# Patient Record
Sex: FEMALE | Race: ASIAN | NOT HISPANIC OR LATINO | Employment: FULL TIME | URBAN - METROPOLITAN AREA
[De-identification: names, ages, dates, MRNs, and addresses within clinical notes are randomized per-mention and may not be internally consistent; named-entity substitution may affect disease eponyms.]

---

## 2022-11-22 ENCOUNTER — TELEPHONE (OUTPATIENT)
Dept: OBGYN | Facility: CLINIC | Age: 37
End: 2022-11-22

## 2022-11-22 NOTE — TELEPHONE ENCOUNTER
New pt to our clinic-TE under on call provider   LMP 10/5/22 5w 0d    Spotting for 5 days, pinkish brown-wearing a panty liner-bleeding is NOT heavy  Some mild cramping intermittently    Discussed spotting and cramping in early pregnancy    Precautions discussed that would warrant emergent evaluation in the ER  Call was completed with .    Transferred to scheduling to make an appt to establish care and viability US  Cari Pena RN on 11/22/2022 at 12:30 PM

## 2022-12-05 ENCOUNTER — OFFICE VISIT (OUTPATIENT)
Dept: OBGYN | Facility: CLINIC | Age: 37
End: 2022-12-05
Payer: COMMERCIAL

## 2022-12-05 ENCOUNTER — ANCILLARY PROCEDURE (OUTPATIENT)
Dept: ULTRASOUND IMAGING | Facility: CLINIC | Age: 37
End: 2022-12-05
Payer: COMMERCIAL

## 2022-12-05 VITALS
HEIGHT: 66 IN | DIASTOLIC BLOOD PRESSURE: 66 MMHG | SYSTOLIC BLOOD PRESSURE: 108 MMHG | BODY MASS INDEX: 21.08 KG/M2 | WEIGHT: 131.2 LBS

## 2022-12-05 DIAGNOSIS — O36.80X0 ENCOUNTER TO DETERMINE FETAL VIABILITY OF PREGNANCY, SINGLE OR UNSPECIFIED FETUS: ICD-10-CM

## 2022-12-05 DIAGNOSIS — O36.80X0 ENCOUNTER TO DETERMINE FETAL VIABILITY OF PREGNANCY, SINGLE OR UNSPECIFIED FETUS: Primary | ICD-10-CM

## 2022-12-05 DIAGNOSIS — O20.9 BLEEDING IN EARLY PREGNANCY: Primary | ICD-10-CM

## 2022-12-05 LAB
ABO/RH(D): NORMAL
SPECIMEN EXPIRATION DATE: NORMAL

## 2022-12-05 PROCEDURE — 99203 OFFICE O/P NEW LOW 30 MIN: CPT | Performed by: STUDENT IN AN ORGANIZED HEALTH CARE EDUCATION/TRAINING PROGRAM

## 2022-12-05 PROCEDURE — 86901 BLOOD TYPING SEROLOGIC RH(D): CPT | Performed by: STUDENT IN AN ORGANIZED HEALTH CARE EDUCATION/TRAINING PROGRAM

## 2022-12-05 PROCEDURE — 36415 COLL VENOUS BLD VENIPUNCTURE: CPT | Performed by: STUDENT IN AN ORGANIZED HEALTH CARE EDUCATION/TRAINING PROGRAM

## 2022-12-05 PROCEDURE — 86900 BLOOD TYPING SEROLOGIC ABO: CPT | Performed by: STUDENT IN AN ORGANIZED HEALTH CARE EDUCATION/TRAINING PROGRAM

## 2022-12-05 PROCEDURE — 76817 TRANSVAGINAL US OBSTETRIC: CPT | Performed by: STUDENT IN AN ORGANIZED HEALTH CARE EDUCATION/TRAINING PROGRAM

## 2022-12-05 RX ORDER — FOLIC ACID 1 MG/1
1 TABLET ORAL DAILY
COMMUNITY
End: 2022-12-22

## 2022-12-05 NOTE — PROGRESS NOTES
"[facility]  OB/GYN Clinic Note    Seen with Michoacano Lopezd      SUBJECTIVE:                                                   Naa Yap is a 37 year old  female who presents to clinic today for the following health issue(s):  Patient presents with:  Follow Up: US follow up- still spotting dark -color discharge when wiping, positive pregnancy test approximately      Additional information: Spotting for 5 days, pinkish brown-wearing a panty liner-bleeding is NOT heavy, Some mild cramping intermittently,  early pregnancy    HPI:  Patient presents for above. Unplanned but desired pregnancy. At 8w4d by LMP. Recent pregnancy- CS in 2021 in [place]. Just moved here in March, adjusting well. Hx of RPL, 7 losses. Worried about another loss.     Patient's last menstrual period was 10/18/2022 (exact date)..   Patient is sexually active, No obstetric history on file..  Using none for contraception.    has no history on file for tobacco use.  STD testing offered?  Declined    Health maintenance updated:  no    Today's PHQ-2 Score: No flowsheet data found.  Today's PHQ-9 Score: No flowsheet data found.  Today's BRANDO-7 Score: No flowsheet data found.    Problem list and histories reviewed & adjusted, as indicated.  Additional history: as documented.    There is no problem list on file for this patient.    No past surgical history on file.   Social History     Tobacco Use     Smoking status: Not on file     Smokeless tobacco: Not on file   Substance Use Topics     Alcohol use: Not on file           Current Outpatient Medications   Medication Sig     folic acid (FOLVITE) 1 MG tablet Take 1 mg by mouth daily     No current facility-administered medications for this visit.     No Known Allergies        OBJECTIVE:     /66   Ht 1.67 m (5' 5.75\")   Wt 59.5 kg (131 lb 3.2 oz)   LMP 10/18/2022 (Exact Date)   BMI 21.34 kg/m    Body mass index is 21.34 kg/m .    Exam:  Constitutional:  " Appearance: Well nourished, well developed alert, in no acute distress  Cervix: no lesions, scant brown discharge from os. Closed.       In-Clinic Test Results:  Results for orders placed or performed in visit on 22 (from the past 24 hour(s))   US OB Transvaginal Only    Narrative    Table formatting from the original result was not included.       Obstetrical Ultrasound Report  OB U/S  < 14 Weeks -  Transvaginal  [facility]  Referring Provider: Anna Marie June MD   Sonographer: Brittnee Spurling, RDMS  Indication:  Viability check      Dating (mm/dd/yyyy):   LMP: 10/05/2022            EDC:  2023            GA by LMP:           8w5d     Current Scan On:  2022          EDC:  2023            GA by Current Scan:          6w4d  The calculation of the gestational age by current scan was based on CRL.  Anatomy Scan:  Frey gestation.  Biometry:  CRL                       0.7 cm                  6w4d                      Yolk Sac                              4.1 mm                                                     Fetal heart activity:  Rate and rhythm is within normal limits.  Fetal   heart rate: 115 bpm  Findings: viable iup     Maternal Structures:  Cervix: The cervix appears long and closed.  Right Ovary: Corpus luteum  Left Ovary: Wnl    Impression:   Frey IUP with growth at 6w 4d (+/- 7-10 days) which is not consistent   with menstrual dating, EDC 23 by this ultrasound.  Repeat US planned   due to RPL, low relative FHR.     Anna Marie June MD, [facility]              ASSESSMENT/PLAN:                                                        ICD-10-CM    1. Bleeding in early pregnancy  O20.9 US OB < 14 Weeks Single     ABO and Rh     ABO and Rh        Naa Ma is a 37 year old  with bleeding in early pregnancy. Dates not consistent with LMP. Will follow-up with repeat US in one week. T&S today. FHR reassuring today. Has hx of RPL. Counseling provided.     Anna Marie  JAQUAN June MD, MHS  [facility] [place]  12/05/22

## 2022-12-12 ENCOUNTER — ANCILLARY PROCEDURE (OUTPATIENT)
Dept: ULTRASOUND IMAGING | Facility: CLINIC | Age: 37
End: 2022-12-12
Attending: STUDENT IN AN ORGANIZED HEALTH CARE EDUCATION/TRAINING PROGRAM
Payer: COMMERCIAL

## 2022-12-12 ENCOUNTER — TELEPHONE (OUTPATIENT)
Dept: OBGYN | Facility: CLINIC | Age: 37
End: 2022-12-12

## 2022-12-12 DIAGNOSIS — O20.9 BLEEDING IN EARLY PREGNANCY: ICD-10-CM

## 2022-12-12 PROCEDURE — 76817 TRANSVAGINAL US OBSTETRIC: CPT | Performed by: STUDENT IN AN ORGANIZED HEALTH CARE EDUCATION/TRAINING PROGRAM

## 2022-12-12 NOTE — RESULT ENCOUNTER NOTE
OK to keep appt as is. Nurse visit can be done in person here at clinic or on the phone  Please call to tell pt no need to change appts  Kari Celeste RN on 12/12/2022 at 3:54 PM

## 2022-12-22 ENCOUNTER — PRENATAL OFFICE VISIT (OUTPATIENT)
Dept: OBGYN | Facility: CLINIC | Age: 37
End: 2022-12-22
Payer: COMMERCIAL

## 2022-12-22 ENCOUNTER — PRENATAL OFFICE VISIT (OUTPATIENT)
Dept: NURSING | Facility: CLINIC | Age: 37
End: 2022-12-22
Payer: COMMERCIAL

## 2022-12-22 VITALS — DIASTOLIC BLOOD PRESSURE: 63 MMHG | BODY MASS INDEX: 21.4 KG/M2 | SYSTOLIC BLOOD PRESSURE: 104 MMHG | WEIGHT: 131.6 LBS

## 2022-12-22 VITALS — BODY MASS INDEX: 21.05 KG/M2 | WEIGHT: 131 LBS | HEIGHT: 66 IN

## 2022-12-22 DIAGNOSIS — O09.91 SUPERVISION OF HIGH RISK PREGNANCY IN FIRST TRIMESTER: ICD-10-CM

## 2022-12-22 DIAGNOSIS — O09.91 SUPERVISION OF HIGH RISK PREGNANCY IN FIRST TRIMESTER: Primary | ICD-10-CM

## 2022-12-22 DIAGNOSIS — Z13.79 GENETIC SCREENING: Primary | ICD-10-CM

## 2022-12-22 PROBLEM — O09.90 SUPERVISION OF HIGH-RISK PREGNANCY: Status: ACTIVE | Noted: 2022-12-22

## 2022-12-22 PROCEDURE — 99207 PR FIRST OB VISIT: CPT | Performed by: STUDENT IN AN ORGANIZED HEALTH CARE EDUCATION/TRAINING PROGRAM

## 2022-12-22 PROCEDURE — 99207 PR NO CHARGE NURSE ONLY: CPT

## 2022-12-22 PROCEDURE — 87491 CHLMYD TRACH DNA AMP PROBE: CPT | Performed by: STUDENT IN AN ORGANIZED HEALTH CARE EDUCATION/TRAINING PROGRAM

## 2022-12-22 PROCEDURE — 87591 N.GONORRHOEAE DNA AMP PROB: CPT | Performed by: STUDENT IN AN ORGANIZED HEALTH CARE EDUCATION/TRAINING PROGRAM

## 2022-12-22 RX ORDER — PRENATAL VIT/IRON FUM/FOLIC AC 27MG-0.8MG
1 TABLET ORAL DAILY
Qty: 90 TABLET | Refills: 3 | Status: SHIPPED | OUTPATIENT
Start: 2022-12-22 | End: 2023-02-08

## 2022-12-22 RX ORDER — FOLIC ACID 1 MG/1
1 TABLET ORAL DAILY
Qty: 90 TABLET | Refills: 3 | Status: SHIPPED | OUTPATIENT
Start: 2022-12-22 | End: 2023-02-08

## 2022-12-22 NOTE — PROGRESS NOTES
SUBJECTIVE:     HPI:    This is a 37 year old female patient,  who presents for her first obstetrical visit.    KAEL: 2023, by Last Menstrual Period.  She is 9w2d weeks.  Her cycles are regular.  Her last menstrual period was normal.   Since her LMP, she has had no complaints   She denies vaginal bleeding.    Additional History: 9 SAB per pt-unsure of dates but stated it happened every year since  nine times, hx of c/s Naa is not sure why, after a lengthy discussion sounded like it was for PreE.      Have you travelled during the pregnancy?No  Have your sexual partner(s) travelled during the pregnancy?No    HISTORY:   Planned Pregnancy: No but desired  Marital Status:   Occupation: Day Care  Living in Household: Spouse and Children    Past History:  Her past medical history   Past Medical History:   Diagnosis Date     Known health problems: none    .      She has a history of possible preeclampsia    Since her last LMP she denies use of alcohol, tobacco and street drugs.    Past medical, surgical, social and family history were reviewed and updated in [facility].        Current Outpatient Medications   Medication     folic acid (FOLVITE) 1 MG tablet     Prenatal Vit-Fe Fumarate-FA (PRENATAL MULTIVITAMIN W/IRON) 27-0.8 MG tablet     No current facility-administered medications for this visit.       ROS:   12 point review of systems negative other than symptoms noted below or in the HPI.    Nurse phone visit completed with assistance of Mandarin interprerter. Prenatal book and folder (containing standard educational hand-outs and brochures)  will be given at the next visit to patient. Information in folder reviewed over the phone. Questions answered. Information given on optional screening available to assess chromosomal anomalies. Pt desires NIPS and would like to know gender. Pt advised to call the clinic if she has any questions or concerns related to her pregnancy. Prenatal labs future  "ordered.   Cari Pena RN on 12/22/2022 at 10:40 AM        No results found for: PAP  PHQ-9 score:  No flowsheet data found.            No flowsheet data found.          Patient supplied answers from flow sheet for:  Prenatal OB Questionnaire.  Past Medical History  Have you ever recieved care for your mental health? : No  Have you ever been in a major accident or suffered serious trauma?: No  Within the last year, has anyone hit, slapped, kicked or otherwise hurt you?: No  In the last year, has anyone forced you to have sex when you didn't want to?: No    Past Medical History 2   Have you ever received a blood transfusion?: No  Would you accept a blood transfusion if was medically recommended?: Yes  Does anyone in your home smoke?: (!) Yes   Is your blood type Rh negative?: No  Have you ever ?: (!) Yes  Have you been hospitalized for a nonsurgical reason excluding normal delivery?: No  Have you ever had an abnormal pap smear?: No    Past Medical History (Continued)  Do you have a history of abnormalities of the uterus?: Unknown  Did your mother take SUSANA or any other hormones when she was pregnant with you?: Unknown  Do you have any other problems we have not asked about which you feel may be important to this pregnancy?: No                   OBJECTIVE:     EXAM:  Ht 1.67 m (5' 5.75\")   Wt 59.4 kg (131 lb)   LMP 10/18/2022 (Exact Date)   BMI 21.31 kg/m   Body mass index is 21.31 kg/m .      "

## 2022-12-22 NOTE — PROGRESS NOTES
[facility]  OB/GYN Clinic Note    Seen with iPad Mandarin      SUBJECTIVE:      HPI:  This is a 37 year old female patient,  who presents for her first obstetrical visit.KAEL: 2023, by Last Menstrual Period.  She is 9w2d weeks.  Her cycles are regular.  Her last menstrual period was normal.   Since her LMP, she has had no complaints   She denies vaginal bleeding.     Additional History: 9 SAB per pt-unsure of dates but stated it happened every year since  nine times, hx of c/s Naa is not sure why, after a lengthy discussion sounded like it was for PreE.    No history of diabetes. Baby weighed 3800g.   PreE dx at 38w- was on PO antihypertensives briefly. No labor.   At CS- fibroids were noted and removed .    Have you travelled during the pregnancy?No  Have your sexual partner(s) travelled during the pregnancy?No     HISTORY:   Planned Pregnancy: No but desired  Marital Status:   Occupation: Day Care  Living in Household: Spouse and Children     Past History:  Her past medical history   Past Medical History        Past Medical History:   Diagnosis Date     Known health problems: none          She has a history of possible preeclampsia  Since her last LMP she denies use of alcohol, tobacco and street drugs.  Past medical, surgical, social and family history were reviewed and updated in [facility].          Current Outpatient Medications   Medication     folic acid (FOLVITE) 1 MG tablet     Prenatal Vit-Fe Fumarate-FA (PRENATAL MULTIVITAMIN W/IRON) 27-0.8 MG tablet      No current facility-administered medications for this visit.         ROS:   12 point review of systems negative other than symptoms noted below or in the HPI.     Nurse phone visit completed with assistance of Mandarin interprerter. Prenatal book and folder (containing standard educational hand-outs and brochures)  will be given at the next visit to patient. Information in folder reviewed over the  phone. Questions answered. Information given on optional screening available to assess chromosomal anomalies. Pt desires NIPS and would like to know gender. Pt advised to call the clinic if she has any questions or concerns related to her pregnancy. Prenatal labs future ordered.   Cari Pena RN on 12/22/2022 at 10:40 AM      No results found for: PAP  PHQ-9 score:  No flowsheet data found.     No flowsheet data found.        Patient supplied answers from flow sheet for:  Prenatal OB Questionnaire.  Past Medical History  Have you ever recieved care for your mental health? : No  Have you ever been in a major accident or suffered serious trauma?: No  Within the last year, has anyone hit, slapped, kicked or otherwise hurt you?: No  In the last year, has anyone forced you to have sex when you didn't want to?: No     Past Medical History 2   Have you ever received a blood transfusion?: No  Would you accept a blood transfusion if was medically recommended?: Yes  Does anyone in your home smoke?: (!) Yes   Is your blood type Rh negative?: No  Have you ever ?: (!) Yes  Have you been hospitalized for a nonsurgical reason excluding normal delivery?: No  Have you ever had an abnormal pap smear?: No     Past Medical History (Continued)  Do you have a history of abnormalities of the uterus?: Unknown  Did your mother take SUSANA or any other hormones when she was pregnant with you?: Unknown  Do you have any other problems we have not asked about which you feel may be important to this pregnancy?: No       OBJECTIVE:     EXAM:  /63   Wt 59.7 kg (131 lb 9.6 oz)   LMP 10/18/2022 (Exact Date)   BMI 21.40 kg/m   Body mass index is 21.4 kg/m .    GENERAL: healthy, alert and no distress  NECK: no adenopathy, no asymmetry, masses, or scars and thyroid normal to palpation  RESP: lungs clear to auscultation - no rales, rhonchi or wheezes  BREAST: normal without masses, tenderness or nipple discharge and no palpable axillary  masses or adenopathy  CV: regular rate and rhythm, normal S1 S2, no S3 or S4, no murmur, click or rub, no peripheral edema and peripheral pulses strong  ABDOMEN: soft, nontender, no hepatosplenomegaly, no masses and bowel sounds normal  MS: no gross musculoskeletal defects noted, no edema  SKIN: no suspicious lesions or rashes  NEURO: Normal strength and tone, mentation intact and speech normal  PSYCH: mentation appears normal, affect normal/bright    ASSESSMENT/PLAN:     37 year old  at 9w2d by LMP c/w 7w5d US here today for initial prenatal visit. Reports doing well overall.       ICD-10-CM    1. Supervision of high risk pregnancy in first trimester  O09.91 folic acid (FOLVITE) 1 MG tablet        1. Normal first ob visit.          Labs: GC/Chlam- today. UA/UCx, CBC, Type and Screen, RPR, Hep B, Hep C, Rubella, HIV at the time of NIPT next week. Pap-  NILM per rerport.         Prenatal testing: reviewed options for genetic screening, patient  Would like NIPT. Discuss AFP next visit.          Education: discussed return precautions, including cramping, vaginal bleeding.  Discussed schedule of prenatal visits through 28w, delivery at [facility] and on-call system for deliveries.          2.  Weight gain        Prepregnancy BMI: Body mass index is 21.4 kg/m .        Expected weight gain:    - Recommended weight gain for pregnancy:    BMI   < 18.5   28-40 lbs   18.5 - 24.9 25-35   25 - 29.9 15-25   > 30  11-20     3. AMA, hx of preE, multiple   - Discussed NIPT, Level II US  - Recommend ASA at 12w- discuss at next visit   - Baseline preE labs.   - Was on antihypertensives starting 2 days prior , prior deliv 38 weeks.     4. Hx of CS x 1  - Done in [place]- no records avail. Likely for preE.   - Continue to address delivery planning     RTC in 4-6weeks    Anna Marie June MD, S  22

## 2022-12-23 LAB
C TRACH DNA SPEC QL NAA+PROBE: NEGATIVE
N GONORRHOEA DNA SPEC QL NAA+PROBE: NEGATIVE

## 2023-01-02 ENCOUNTER — LAB (OUTPATIENT)
Dept: LAB | Facility: CLINIC | Age: 38
End: 2023-01-02
Payer: COMMERCIAL

## 2023-01-02 DIAGNOSIS — O09.91 SUPERVISION OF HIGH RISK PREGNANCY IN FIRST TRIMESTER: ICD-10-CM

## 2023-01-02 DIAGNOSIS — Z13.79 GENETIC SCREENING: Primary | ICD-10-CM

## 2023-01-02 LAB
ALBUMIN MFR UR ELPH: 18.2 MG/DL
ALBUMIN UR-MCNC: NEGATIVE MG/DL
ALT SERPL W P-5'-P-CCNC: 19 U/L (ref 10–35)
APPEARANCE UR: CLEAR
AST SERPL W P-5'-P-CCNC: 18 U/L (ref 10–35)
BILIRUB UR QL STRIP: NEGATIVE
COLOR UR AUTO: YELLOW
CREAT SERPL-MCNC: 0.54 MG/DL (ref 0.51–0.95)
CREAT UR-MCNC: 192 MG/DL
ERYTHROCYTE [DISTWIDTH] IN BLOOD BY AUTOMATED COUNT: 12.4 % (ref 10–15)
GFR SERPL CREATININE-BSD FRML MDRD: >90 ML/MIN/1.73M2
GLUCOSE UR STRIP-MCNC: NEGATIVE MG/DL
HCT VFR BLD AUTO: 39 % (ref 35–47)
HGB BLD-MCNC: 13 G/DL (ref 11.7–15.7)
HGB UR QL STRIP: NEGATIVE
KETONES UR STRIP-MCNC: NEGATIVE MG/DL
LEUKOCYTE ESTERASE UR QL STRIP: NEGATIVE
MCH RBC QN AUTO: 28 PG (ref 26.5–33)
MCHC RBC AUTO-ENTMCNC: 33.3 G/DL (ref 31.5–36.5)
MCV RBC AUTO: 84 FL (ref 78–100)
NITRATE UR QL: NEGATIVE
PH UR STRIP: 6 [PH] (ref 5–7)
PLATELET # BLD AUTO: 187 10E3/UL (ref 150–450)
PROT/CREAT 24H UR: 0.09 MG/MG CR (ref 0–0.2)
RBC # BLD AUTO: 4.64 10E6/UL (ref 3.8–5.2)
SP GR UR STRIP: >=1.03 (ref 1–1.03)
UROBILINOGEN UR STRIP-ACNC: 0.2 E.U./DL
WBC # BLD AUTO: 7 10E3/UL (ref 4–11)

## 2023-01-02 PROCEDURE — 84156 ASSAY OF PROTEIN URINE: CPT

## 2023-01-02 PROCEDURE — 87340 HEPATITIS B SURFACE AG IA: CPT

## 2023-01-02 PROCEDURE — 81003 URINALYSIS AUTO W/O SCOPE: CPT

## 2023-01-02 PROCEDURE — 86762 RUBELLA ANTIBODY: CPT

## 2023-01-02 PROCEDURE — 86803 HEPATITIS C AB TEST: CPT

## 2023-01-02 PROCEDURE — 82565 ASSAY OF CREATININE: CPT

## 2023-01-02 PROCEDURE — 84460 ALANINE AMINO (ALT) (SGPT): CPT

## 2023-01-02 PROCEDURE — 87086 URINE CULTURE/COLONY COUNT: CPT

## 2023-01-02 PROCEDURE — 85027 COMPLETE CBC AUTOMATED: CPT

## 2023-01-02 PROCEDURE — 36415 COLL VENOUS BLD VENIPUNCTURE: CPT

## 2023-01-02 PROCEDURE — 86780 TREPONEMA PALLIDUM: CPT

## 2023-01-02 PROCEDURE — 87389 HIV-1 AG W/HIV-1&-2 AB AG IA: CPT

## 2023-01-02 PROCEDURE — 84450 TRANSFERASE (AST) (SGOT): CPT

## 2023-01-03 LAB
BACTERIA UR CULT: NO GROWTH
HBV SURFACE AG SERPL QL IA: NONREACTIVE
HCV AB SERPL QL IA: NONREACTIVE
HIV 1+2 AB+HIV1 P24 AG SERPL QL IA: NONREACTIVE
RUBV IGG SERPL QL IA: 10.8 INDEX
RUBV IGG SERPL QL IA: POSITIVE
T PALLIDUM AB SER QL: NONREACTIVE

## 2023-01-09 ENCOUNTER — PRENATAL OFFICE VISIT (OUTPATIENT)
Dept: OBGYN | Facility: CLINIC | Age: 38
End: 2023-01-09
Payer: COMMERCIAL

## 2023-01-09 ENCOUNTER — PREP FOR PROCEDURE (OUTPATIENT)
Dept: OBGYN | Facility: CLINIC | Age: 38
End: 2023-01-09

## 2023-01-09 VITALS
DIASTOLIC BLOOD PRESSURE: 60 MMHG | HEIGHT: 66 IN | SYSTOLIC BLOOD PRESSURE: 100 MMHG | BODY MASS INDEX: 21.31 KG/M2 | WEIGHT: 132.6 LBS

## 2023-01-09 DIAGNOSIS — O09.291 HISTORY OF PRE-ECLAMPSIA IN PRIOR PREGNANCY, CURRENTLY PREGNANT IN FIRST TRIMESTER: ICD-10-CM

## 2023-01-09 DIAGNOSIS — O34.219 PREVIOUS CESAREAN DELIVERY AFFECTING PREGNANCY: ICD-10-CM

## 2023-01-09 DIAGNOSIS — O34.219 HISTORY OF CESAREAN SECTION COMPLICATING PREGNANCY: ICD-10-CM

## 2023-01-09 DIAGNOSIS — O09.529 SUPERVISION OF HIGH-RISK PREGNANCY OF ELDERLY MULTIGRAVIDA: Primary | ICD-10-CM

## 2023-01-09 DIAGNOSIS — O09.521 HIGH-RISK PREGNANCY, ELDERLY MULTIGRAVIDA IN FIRST TRIMESTER: Primary | ICD-10-CM

## 2023-01-09 LAB — SCANNED LAB RESULT: NORMAL

## 2023-01-09 PROCEDURE — 99207 PR PRENATAL VISIT: CPT | Performed by: OBSTETRICS & GYNECOLOGY

## 2023-01-09 NOTE — PROGRESS NOTES
Patient is being seen today for her 12 week OB week and is going to be seeing Dr. so otherwise   Doing well overall but has been vomiting all of the sudden on occasion. denies nausea. Happens when she brushes her teeth and when she is eating.   Had similar issues with her sone but seems somewhat better this pregnancy/  Declines nausea meds and reviewed small frequent meals, avoiding overeating, brushing teeth with caution, etc   Did have pre-e with her previous pregnancy and all baseline pre-e labs from [facility] were normal. BP looks good today.   Had a C/s with her previous pregnancy. No IOL offered by the sounds of it and that doc just recommended primary c/s at 37 weeks. However her  pushed for her to go one extra week so had c/s at 38 weeks and son was 8-6#   Would like to have a C/s this time as well.   Discussed that if n o signs of GHTN or pre-e we would schedule C/s @ 39 weeks and per Dr. FOSTER's schedule that would be 7/20 so will put in for that  Return 4 weeks and offer AFP at that time  Placed order for DAE FRASER given AMA

## 2023-01-10 ENCOUNTER — TELEPHONE (OUTPATIENT)
Dept: OBGYN | Facility: CLINIC | Age: 38
End: 2023-01-10

## 2023-01-10 ENCOUNTER — TRANSCRIBE ORDERS (OUTPATIENT)
Dept: MATERNAL FETAL MEDICINE | Facility: CLINIC | Age: 38
End: 2023-01-10

## 2023-01-10 DIAGNOSIS — O26.90 PREGNANCY RELATED CONDITION, ANTEPARTUM: Primary | ICD-10-CM

## 2023-01-10 NOTE — TELEPHONE ENCOUNTER
ERAS BAG GIVEN No  ERAS INSTRUCTIONS EXPLAINED Yes    ASSIST: MARYA    H&P TO BE COMPLETED BY:   Surgeon  FOR H&P TO BE DONE BY SURGEON   UPDATE VISIT ON DATE AT HOSPITAL  SAME DAY/OBSERVATION/INPATIENT: ADMIT  EQUIPMENT: ROUTINE  VENDOR NEEDED AT CASE: NA  IF IUD WHAT BRAND NA  LABS/SPECIAL INSTRUCTIONS: ROUTINE  TIME OFF WORK: 8 WEEKS  ESTIMATED DOCTOR TIME NEEDED IN MINUTES 90  POST OP TO BE SCHEDULED AT 6 WEEKS AFTER SX APPOINTMENT LENGTH IN MINUTES 30    WOULD YOU LIKE YOUR PATIENT PRE-PROCEDURE COVID TESTED? No

## 2023-01-10 NOTE — TELEPHONE ENCOUNTER
Type of surgery: RPT CS  Location of surgery: [facility]  Date and time of surgery: 7/20/2023 7:15a  Surgeon: JIMMY Dorantes  Pre-Op Appt Date: HOSPITAL  Post-Op Appt Date: 9/1/2023 11a   Packet sent out: MAILED 1/10/2023  Pre-cert/Authorization completed:  TBD  Date: 1/10/2023 Chris sparrow/Jacquie West  Surgery Scheduler

## 2023-02-08 ENCOUNTER — PRENATAL OFFICE VISIT (OUTPATIENT)
Dept: OBGYN | Facility: CLINIC | Age: 38
End: 2023-02-08
Payer: COMMERCIAL

## 2023-02-08 VITALS — DIASTOLIC BLOOD PRESSURE: 62 MMHG | BODY MASS INDEX: 22.12 KG/M2 | SYSTOLIC BLOOD PRESSURE: 100 MMHG | WEIGHT: 136 LBS

## 2023-02-08 DIAGNOSIS — O09.92 SUPERVISION OF HIGH RISK PREGNANCY IN SECOND TRIMESTER: Primary | ICD-10-CM

## 2023-02-08 DIAGNOSIS — O09.291 HISTORY OF PRE-ECLAMPSIA IN PRIOR PREGNANCY, CURRENTLY PREGNANT IN FIRST TRIMESTER: ICD-10-CM

## 2023-02-08 DIAGNOSIS — O09.91 SUPERVISION OF HIGH RISK PREGNANCY IN FIRST TRIMESTER: ICD-10-CM

## 2023-02-08 PROCEDURE — 99000 SPECIMEN HANDLING OFFICE-LAB: CPT | Performed by: STUDENT IN AN ORGANIZED HEALTH CARE EDUCATION/TRAINING PROGRAM

## 2023-02-08 PROCEDURE — 82105 ALPHA-FETOPROTEIN SERUM: CPT | Mod: 90 | Performed by: STUDENT IN AN ORGANIZED HEALTH CARE EDUCATION/TRAINING PROGRAM

## 2023-02-08 PROCEDURE — 99207 PR PRENATAL VISIT: CPT | Performed by: STUDENT IN AN ORGANIZED HEALTH CARE EDUCATION/TRAINING PROGRAM

## 2023-02-08 PROCEDURE — 36415 COLL VENOUS BLD VENIPUNCTURE: CPT | Performed by: STUDENT IN AN ORGANIZED HEALTH CARE EDUCATION/TRAINING PROGRAM

## 2023-02-08 RX ORDER — PRENATAL VIT/IRON FUM/FOLIC AC 27MG-0.8MG
1 TABLET ORAL DAILY
Qty: 90 TABLET | Refills: 3 | Status: SHIPPED | OUTPATIENT
Start: 2023-02-08 | End: 2023-04-03

## 2023-02-08 RX ORDER — FOLIC ACID 1 MG/1
1 TABLET ORAL DAILY
Qty: 90 TABLET | Refills: 3 | Status: SHIPPED | OUTPATIENT
Start: 2023-02-08 | End: 2023-05-30

## 2023-02-08 NOTE — PROGRESS NOTES
Prenatal Care (16w1d)    Naa Yap is a 37 year old  at 16w1d by LMP c/w 6w5d US presenting for routine prenatal care. She is doing well.  Denies LOF, VB, ctx.    Conditions affecting pregnancy:  - AMA  - hx of CS x 1   - Hx of preE       Objective- see flow sheet    Naa Yap is a 37 year old  at 16w1d presenting for routine ob visit.       ICD-10-CM    1. Supervision of high risk pregnancy in second trimester  O09.92 Mat Fetal Med Ctr Referral - Pregnancy     Alpha fetoprotein maternal screen     Alpha fetoprotein maternal screen      2. History of pre-eclampsia in prior pregnancy, currently pregnant in first trimester  O09.291 Aspirin 81 MG CAPS      3. Supervision of high risk pregnancy in first trimester  O09.91 Prenatal Vit-Fe Fumarate-FA (PRENATAL MULTIVITAMIN W/IRON) 27-0.8 MG tablet        - Hx of preE   - ASA- rx provided.    - Baseline labs wnl. Repeat as clinically indicated.    - BP normal today   - First tri labs wnl. AMA- level II ordered. S/p COVID-19 vaccine-received three doses in [place], last in 2022. Has not received flu shot. Okay receive today.    - Discussed routine precautions. Rx for PNV provided.   - Hx of CS x 1: repeat scheduled   - TWlbs. Pregravid BMI 21. Expect 25-35lbs.    Follow-up in 4 weeks for routine care.     Anna Marie June MD, S  23

## 2023-02-10 LAB
# FETUSES US: NORMAL
AFP MOM SERPL: 0.91
AFP SERPL-MCNC: 34 NG/ML
AGE - REPORTED: 38.8 YR
CURRENT SMOKER: NO
FAMILY MEMBER DISEASES HX: NO
GA METHOD: NORMAL
GA: NORMAL WK
IDDM PATIENT QL: NO
INTEGRATED SCN PATIENT-IMP: NORMAL
SPECIMEN DRAWN SERPL: NORMAL

## 2023-02-12 ENCOUNTER — HEALTH MAINTENANCE LETTER (OUTPATIENT)
Age: 38
End: 2023-02-12

## 2023-03-06 ENCOUNTER — PRENATAL OFFICE VISIT (OUTPATIENT)
Dept: OBGYN | Facility: CLINIC | Age: 38
End: 2023-03-06
Payer: COMMERCIAL

## 2023-03-06 VITALS
BODY MASS INDEX: 22.44 KG/M2 | WEIGHT: 138 LBS | SYSTOLIC BLOOD PRESSURE: 104 MMHG | HEART RATE: 80 BPM | DIASTOLIC BLOOD PRESSURE: 62 MMHG

## 2023-03-06 DIAGNOSIS — O22.42 HEMORRHOIDS DURING PREGNANCY IN SECOND TRIMESTER: ICD-10-CM

## 2023-03-06 DIAGNOSIS — O09.92 SUPERVISION OF HIGH RISK PREGNANCY IN SECOND TRIMESTER: Primary | ICD-10-CM

## 2023-03-06 DIAGNOSIS — Z23 NEED FOR PROPHYLACTIC VACCINATION AND INOCULATION AGAINST INFLUENZA: ICD-10-CM

## 2023-03-06 DIAGNOSIS — I49.9 IRREGULAR HEART BEAT: ICD-10-CM

## 2023-03-06 PROCEDURE — 90686 IIV4 VACC NO PRSV 0.5 ML IM: CPT | Performed by: STUDENT IN AN ORGANIZED HEALTH CARE EDUCATION/TRAINING PROGRAM

## 2023-03-06 PROCEDURE — 99207 PR PRENATAL VISIT: CPT | Performed by: STUDENT IN AN ORGANIZED HEALTH CARE EDUCATION/TRAINING PROGRAM

## 2023-03-06 PROCEDURE — 90471 IMMUNIZATION ADMIN: CPT | Performed by: STUDENT IN AN ORGANIZED HEALTH CARE EDUCATION/TRAINING PROGRAM

## 2023-03-06 RX ORDER — ASPIRIN 81 MG/1
TABLET, COATED ORAL
COMMUNITY
Start: 2023-02-08 | End: 2023-03-06

## 2023-03-06 RX ORDER — BENZOCAINE/MENTHOL 6 MG-10 MG
LOZENGE MUCOUS MEMBRANE 2 TIMES DAILY
Qty: 45 G | Refills: 1 | Status: SHIPPED | OUTPATIENT
Start: 2023-03-06 | End: 2023-07-10

## 2023-03-06 NOTE — PROGRESS NOTES
"Prenatal Care (19w6d)    Seen with phone Mandarin .     Naa Yap is a 37 year old  at 61y3kin LMP c/w 6w5d US presenting for routine prenatal care. She is doing well. Noticed some increase in HR. Sometimes feels a \"needle\" pain in the chest.  Was told about possible irregular rhythm at last delivery. Has not had cardiology follow-up.   Hemorrhoids- no blood in stool. Constipation. Feels like is protruding. Has itching.   Denies LOF, VB, ctx.    Conditions affecting pregnancy:  - AMA  - hx of CS x 1   - Hx of preE     Objective- see flow sheet  Vitals:    23 1131   BP: 104/62   Pulse: 80   Weight: 62.6 kg (138 lb)       Naa Yap is a 37 year old  at 19w6d presenting for routine ob visit.       ICD-10-CM    1. Supervision of high risk pregnancy in second trimester  O09.92       2. Hemorrhoids during pregnancy in second trimester  O22.42 hydrocortisone (CORTAID) 1 % external cream      3. Irregular heart beat  I49.9 EKG 12-lead complete with read (future)        - HR: normal today. Hx of \"irregular rhythm\" during last delivery. Will start with EKG and consider cardiology referral.  Reviewed return precautions.     - Hx of preE   - ASA- rx provided.    - Baseline labs wnl. Repeat as clinically indicated.    - BP normal today   - First tri labs wnl. AMA- level II ordered- message sent for scheduling. S/p COVID-19 vaccine-received three doses in [place], last in 2022. Flu vax today. Did not get last visit.   - Discussed routine precautions. Hemorrhoids- rx provided.   - Hx of CS x 1: repeat scheduled   - TWlbs. Pregravid BMI 21. Expect 25-35lbs.    Follow-up in 4 weeks for routine care.     Anna Marie June MD, S  23  "

## 2023-03-27 ENCOUNTER — PRE VISIT (OUTPATIENT)
Dept: MATERNAL FETAL MEDICINE | Facility: CLINIC | Age: 38
End: 2023-03-27
Payer: COMMERCIAL

## 2023-04-03 ENCOUNTER — PRENATAL OFFICE VISIT (OUTPATIENT)
Dept: OBGYN | Facility: CLINIC | Age: 38
End: 2023-04-03
Payer: COMMERCIAL

## 2023-04-03 ENCOUNTER — OFFICE VISIT (OUTPATIENT)
Dept: MATERNAL FETAL MEDICINE | Facility: CLINIC | Age: 38
End: 2023-04-03
Attending: OBSTETRICS & GYNECOLOGY
Payer: COMMERCIAL

## 2023-04-03 ENCOUNTER — HOSPITAL ENCOUNTER (OUTPATIENT)
Dept: ULTRASOUND IMAGING | Facility: CLINIC | Age: 38
Discharge: HOME | End: 2023-04-03
Attending: OBSTETRICS & GYNECOLOGY
Payer: COMMERCIAL

## 2023-04-03 VITALS
BODY MASS INDEX: 23.72 KG/M2 | SYSTOLIC BLOOD PRESSURE: 105 MMHG | WEIGHT: 147.6 LBS | HEIGHT: 66 IN | DIASTOLIC BLOOD PRESSURE: 59 MMHG

## 2023-04-03 DIAGNOSIS — O26.90 PREGNANCY RELATED CONDITION, ANTEPARTUM: ICD-10-CM

## 2023-04-03 DIAGNOSIS — O09.522 SUPERVISION OF ELDERLY MULTIGRAVIDA IN SECOND TRIMESTER: Primary | ICD-10-CM

## 2023-04-03 DIAGNOSIS — O35.EXX0 PYELECTASIS OF FETUS ON PRENATAL ULTRASOUND: ICD-10-CM

## 2023-04-03 DIAGNOSIS — O09.522 AMA (ADVANCED MATERNAL AGE) MULTIGRAVIDA 35+, SECOND TRIMESTER: Primary | ICD-10-CM

## 2023-04-03 DIAGNOSIS — N96 HISTORY OF RECURRENT MISCARRIAGES: ICD-10-CM

## 2023-04-03 DIAGNOSIS — O09.91 SUPERVISION OF HIGH RISK PREGNANCY IN FIRST TRIMESTER: ICD-10-CM

## 2023-04-03 DIAGNOSIS — O09.92 SUPERVISION OF HIGH RISK PREGNANCY IN SECOND TRIMESTER: Primary | ICD-10-CM

## 2023-04-03 DIAGNOSIS — O09.291 HISTORY OF PRE-ECLAMPSIA IN PRIOR PREGNANCY, CURRENTLY PREGNANT IN FIRST TRIMESTER: ICD-10-CM

## 2023-04-03 PROCEDURE — 76811 OB US DETAILED SNGL FETUS: CPT | Mod: 26 | Performed by: OBSTETRICS & GYNECOLOGY

## 2023-04-03 PROCEDURE — 99203 OFFICE O/P NEW LOW 30 MIN: CPT | Mod: 25 | Performed by: OBSTETRICS & GYNECOLOGY

## 2023-04-03 PROCEDURE — 99207 PR PRENATAL VISIT: CPT | Performed by: STUDENT IN AN ORGANIZED HEALTH CARE EDUCATION/TRAINING PROGRAM

## 2023-04-03 PROCEDURE — 76811 OB US DETAILED SNGL FETUS: CPT

## 2023-04-03 PROCEDURE — 96040 HC GENETIC COUNSELING, EACH 30 MINUTES: CPT | Performed by: GENETIC COUNSELOR, MS

## 2023-04-03 RX ORDER — PRENATAL VIT/IRON FUM/FOLIC AC 27MG-0.8MG
1 TABLET ORAL DAILY
Qty: 90 TABLET | Refills: 3 | Status: SHIPPED | OUTPATIENT
Start: 2023-04-03 | End: 2023-05-30

## 2023-04-03 NOTE — PROGRESS NOTES
[facility]  Genetic Counseling Consult    Patient:  Naa Yap YOB: 1985   Date of Service:  23   MRN: [id number]    Naa was seen at the [facility] for genetic consultation. The indication for genetic counseling is advanced maternal age. The patient was unaccompanied to this visit.  The patient is wearing a mask due to current [facility] policies.     The session was conducted with a Mandarin ipad  (Language Line [id number]) due to the patient speaking limited English.      IMPRESSION/ PLAN   1. Naa had genetic screening earlier in this pregnancy. Their non-invasive prenatal test was screen negative or low risk for screened conditions     2. During today's [facility] visit, Naa had a genetic counseling session only. Additional screening and diagnostic testing was discussed and declined.     3. Naa had a level II comprehensive anatomy ultrasound today. Please see the ultrasound report for further details.      PREGNANCY HISTORY   /Parity:       Naa's pregnancy history is significant for:   1 prior full term delivery complicated by preeclampsia and a reported 10 first trimester SAB. Naa reports that all of her losses occurred before 10 weeks gestation.     We reviewed that at least 8-20% of the recognized pregnancies end in miscarriage, with over 80% of losses occurring in the first trimester. Commonly reported causes of recurrent pregnancy loss include the following: endocrine, environmental, maternal anatomical abnormalities, immune system and blood clotting issues, and chromosome abnormalities.  Around half of miscarriages that occur before 20 weeks gestation are caused by chromosome abnormalities. The risk for a miscarriage increases with advancing maternal age due to a higher incidence of conceptions with a chromosomal aneuploidy.        We discussed that an evaluation for recurrent  "pregnancy loss would be available for her if desired, and Naa would consider this before any future pregnancies as a preconception consult.    CURRENT PREGNANCY   Current Age: 37 year old   Age at Delivery: 37 year old  KAEL: 2023, by Last Menstrual Period                                   Gestational Age: 23w6d  This pregnancy is a single gestation.   This pregnancy was conceived spontaneously.    MEDICAL HISTORY   Naa gonzalez reported medical history is not expected to impact pregnancy management or risks to fetal development.       FAMILY HISTORY   A three-generation pedigree was obtained today and is scanned under the \"Media\" tab in Epic. The family history was reported by Naa.    The reported family history is unremarkable for multiple miscarriages, stillbirths, birth defects, intellectual disabilities, known genetic conditions, and consanguinity.       CARRIER SCREENING   Expanded carrier screening is available to screen for autosomal recessive conditions and X-linked conditions in a large list of genes. Autosomal recessive conditions happen when a mutation has been inherited from the egg and sperm and include conditions like cystic fibrosis, thalassemia, hearing loss, spinal muscular atrophy, and more. X-linked conditions happen when a mutation has been inherited from the egg and include conditions like fragile X syndrome. [place] screening was also reviewed. About [place]  Screening        Carrier screening was not discussed today. If the patient is interested in further discussing the option of carrier screening, MFM would be available to assist in coordination if desired.       RISK ASSESSMENT FOR CHROMOSOME CONDITIONS   We explained that the risk for fetal chromosome abnormalities increases with maternal age. We discussed specific features of common chromosome abnormalities, including Down syndrome, trisomy 13, trisomy 18, and sex chromosome trisomies.      At age 37 at midtrimester, the risk to have a " baby with Down syndrome is 1 in 129.     At age 37 at midtrimester, the risk to have a baby with any chromosome abnormality is 1 in 65.     Naa had genetic screening earlier in this pregnancy. Their non-invasive prenatal test was screen negative or low risk for screened conditions     Non-invasive prenatal testing (NIPT) results    Maternal plasma cell-free DNA testing    Screens for fetal trisomy 21, trisomy 13, trisomy 18, and sex chromosome aneuploidy    First trimester ultrasound with nuchal translucency and nasal bone assessment was not performed in this pregnancy, to our knowledge.    Naa had an [employer]itaeNIPS test earlier in pregnancy; we reviewed the results today, which are low risk.    The NIPT did include sex chromosome aneuploidies and the result was low risk. The predicted sex is XY, which is typically male.    Given the accuracy of this test, these results greatly decrease the chance for certain fetal chromosome abnormalities    We discussed the limitations of normal NIPT results    Maternal serum AFP only to screen for open neural tube defects (after 15 weeks) results were within normal limits at a 0.91 MoM, which decreased the risk of an open neural tube defect to 1 in 10,000.       GENETIC TESTING OPTIONS   Genetic testing during a pregnancy includes screening and diagnostic procedures.      Screening tests are non-invasive which means no risk to the pregnancy and includes ultrasounds and blood work. The benefits and limitations of screening were reviewed. Screening tests provide a risk assessment (chance) specific to the pregnancy for certain fetal chromosome abnormalities but cannot definitively diagnose or exclude a fetal chromosome abnormality. Follow-up genetic counseling and consideration of diagnostic testing is recommended with any abnormal screening result. Diagnostic testing during a pregnancy is more certain and can test for more conditions. However, the tests do have a risk of miscarriage  that requires careful consideration. These tests can detect fetal chromosome abnormalities with greater than 99% certainty. Results can be compromised by maternal cell contamination or mosaicism and are limited by the resolution of current genetic testing technology.     There is no screening or diagnostic test that detects all forms of birth defects or intellectual disability.     We discussed the following screening options:   Non-invasive prenatal testing (NIPT)    Also called cell-free DNA screening because it detects chromosomes from the placenta in the pregnant person's blood    Can be done any time after 10 weeks gestation    Screens for trisomy 21, trisomy 18, trisomy 13, and sex chromosome aneuploidies    Cannot screen for open neural tube defects, maternal serum AFP after 15 weeks is recommended      We discussed the following ultrasound options:  Comprehensive level II ultrasound (Fetal Anatomy Ultrasound)    Ultrasound done between 18-20 weeks gestation    Screens for major birth defects and markers for aneuploidy (like trisomy 21 and trisomy 18)    Includes looking at the fetus/baby's growth, heart, organs (stomach, kidneys), placenta, and amniotic fluid      We discussed the following diagnostic options:   Amniocentesis    Invasive diagnostic procedure done after 15 weeks gestation    The procedure collects a small sample of amniotic fluid for the purpose of chromosomal testing and/or other genetic testing    Diagnostic result; more than 99% sensitivity for fetal chromosome abnormalities    Testing for AFP in the amniotic fluid can test for open neural tube defects        It was a pleasure to be involved with [facility]. Face-to-face time of the meeting was 30 minutes.    Jose David Beach, GC, MS, C  Certified and [place] Licensed Genetic Counselor  [facility]  Maternal Fetal Medicine  Office: [phone]  [facility]: [phone]   Fax: [phone]  [facility]

## 2023-04-03 NOTE — PROGRESS NOTES
"Prenatal Care (23w6d)    Seen with phone Mandarin .     Naa Yap is a 37 year old  at 23w6d by LMP c/w 6w5d US presenting for routine prenatal care. Doing well. Denies LOF, VB, ctx. +FM.     Conditions affecting pregnancy:  - AMA  - hx of CS x 1   - Hx of preE     Objective- see flow sheet  Vitals:    23 1116   BP: 105/59   Weight: 67 kg (147 lb 9.6 oz)   Height: 1.67 m (5' 5.75\")       Naa Yap is a 37 year old  at 23w6d presenting for routine ob visit.       ICD-10-CM    1. Supervision of high risk pregnancy in second trimester  O09.92       2. History of pre-eclampsia in prior pregnancy, currently pregnant in first trimester  O09.291 Aspirin 81 MG CAPS      3. Supervision of high risk pregnancy in first trimester  O09.91 Prenatal Vit-Fe Fumarate-FA (PRENATAL MULTIVITAMIN W/IRON) 27-0.8 MG tablet        - Hx of preE   - ASA- rx provided.    - Baseline labs wnl. Repeat as clinically indicated.    - BP normal today   - First tri labs wnl. AMA- level II today. S/p COVID-19 vaccine-received three doses in [place], last in 2022. S/p Flu vax. Third tri labs, TDAP next visit.   - Discussed routine precautions. Hemorrhoids- rx provided.   - Hx of CS x 1: repeat scheduled   - TWlbs. Pregravid BMI 21. Expect 25-35lbs.    Follow-up in 4 weeks for routine care/third tri labs     Anna Marie June MD, S  23    "

## 2023-04-03 NOTE — NURSING NOTE
Patient presents to [facility] for L2US at 23+6 weeks due to AMA. Positive fetal movement. Denies LOF, vaginal bleeding or cramping/contractions. SBAR given to CHAD MD, see their note in Epic.

## 2023-04-03 NOTE — PROGRESS NOTES
"Please see \"Imaging\" tab under \"Chart Review\" for details of today's visit.    Fiordaliza Kline    "

## 2023-04-25 DIAGNOSIS — Z23 NEED FOR TDAP VACCINATION: ICD-10-CM

## 2023-04-25 DIAGNOSIS — Z36.9 ENCOUNTER FOR ANTENATAL SCREENING OF MOTHER: Primary | ICD-10-CM

## 2023-05-01 ENCOUNTER — LAB (OUTPATIENT)
Dept: LAB | Facility: CLINIC | Age: 38
End: 2023-05-01
Payer: COMMERCIAL

## 2023-05-01 ENCOUNTER — PRENATAL OFFICE VISIT (OUTPATIENT)
Dept: OBGYN | Facility: CLINIC | Age: 38
End: 2023-05-01
Payer: COMMERCIAL

## 2023-05-01 VITALS — SYSTOLIC BLOOD PRESSURE: 112 MMHG | BODY MASS INDEX: 24.07 KG/M2 | DIASTOLIC BLOOD PRESSURE: 60 MMHG | WEIGHT: 148 LBS

## 2023-05-01 DIAGNOSIS — O09.93 SUPERVISION OF HIGH RISK PREGNANCY IN THIRD TRIMESTER: Primary | ICD-10-CM

## 2023-05-01 DIAGNOSIS — R73.02 IMPAIRED GLUCOSE TOLERANCE: Primary | ICD-10-CM

## 2023-05-01 DIAGNOSIS — Z36.9 ENCOUNTER FOR ANTENATAL SCREENING OF MOTHER: ICD-10-CM

## 2023-05-01 DIAGNOSIS — Z23 NEED FOR TDAP VACCINATION: ICD-10-CM

## 2023-05-01 LAB
ERYTHROCYTE [DISTWIDTH] IN BLOOD BY AUTOMATED COUNT: 12.5 % (ref 10–15)
GLUCOSE 1H P 50 G GLC PO SERPL-MCNC: 146 MG/DL (ref 70–129)
HCT VFR BLD AUTO: 37.4 % (ref 35–47)
HGB BLD-MCNC: 12.4 G/DL (ref 11.7–15.7)
MCH RBC QN AUTO: 29.5 PG (ref 26.5–33)
MCHC RBC AUTO-ENTMCNC: 33.2 G/DL (ref 31.5–36.5)
MCV RBC AUTO: 89 FL (ref 78–100)
PLATELET # BLD AUTO: 168 10E3/UL (ref 150–450)
RBC # BLD AUTO: 4.21 10E6/UL (ref 3.8–5.2)
WBC # BLD AUTO: 8.9 10E3/UL (ref 4–11)

## 2023-05-01 PROCEDURE — 90471 IMMUNIZATION ADMIN: CPT | Performed by: STUDENT IN AN ORGANIZED HEALTH CARE EDUCATION/TRAINING PROGRAM

## 2023-05-01 PROCEDURE — 36415 COLL VENOUS BLD VENIPUNCTURE: CPT

## 2023-05-01 PROCEDURE — 90715 TDAP VACCINE 7 YRS/> IM: CPT | Performed by: STUDENT IN AN ORGANIZED HEALTH CARE EDUCATION/TRAINING PROGRAM

## 2023-05-01 PROCEDURE — 85027 COMPLETE CBC AUTOMATED: CPT

## 2023-05-01 PROCEDURE — 82950 GLUCOSE TEST: CPT

## 2023-05-01 PROCEDURE — 99207 PR PRENATAL VISIT: CPT | Performed by: STUDENT IN AN ORGANIZED HEALTH CARE EDUCATION/TRAINING PROGRAM

## 2023-05-01 NOTE — LETTER
May 1, 2023      Naa Ma  [id number] [facility] [place] [facility] [address]        To Whom It May Concern,     Naa had a test called the NIPT testing completed due to a high risk pregnancy. This test is indicated due to advanced maternal age. Please reach out to our office directly with any questions or concerns.           Sincerely,      Electronically signed by:   Anna Marie June MD

## 2023-05-01 NOTE — PROGRESS NOTES
Prenatal Care    Seen with phone Mandarin .     Naa Yap is a 37 year old  at 27w6d by LMP c/w 6w5d US presenting for routine prenatal care. Doing well. Has bloating/pressure for last several weeks in upper abdomen. Started TUMS and is feeling better. Denies LOF, VB, ctx. +FM.     Conditions affecting pregnancy:  - AMA  - hx of CS x 1   - Hx of preE     Objective- see flow sheet  Vitals:    23 0945   BP: 112/60   Weight: 67.1 kg (148 lb)       Naa Yap is a 37 year old  at 27w6d presenting for routine ob visit.       ICD-10-CM    1. Supervision of high risk pregnancy in third trimester  O09.93       2. Need for Tdap vaccination  Z23 TDAP 10-64Y (ADACEL,BOOSTRIX)        - Hx of preE   - ASA- rx provided.    - Baseline labs wnl. Repeat as clinically indicated.    - BP normal today      - First tri labs wnl. NIPT was not covered by insurance, requesting letter for coverage. Sent today.  AMA- level II today. S/p COVID-19 vaccine-received three doses in [place], last in 2022. S/p Flu vax. Third tri labs, TDAP today. Failed GCT, needs GTT. Instructed to schedule.   - Discussed routine precautions. Hemorrhoids- rx provided. Taking TUMS for GERD sx. 3rd tri hand outs provided.   - Hx of CS x 1: repeat scheduled . Would like to deliver at 11am instead for cultural reasons. Reviewed exact timing cannot be guaranteed. Msg sent to change timing. Requesting in-person Mandarin  for surgery, request sent.   - TW lbs. Pregravid BMI 21. Expect 25-35lbs.    Follow-up in 2 weeks for routine care    Anna Marie June MD, S  23

## 2023-05-01 NOTE — TELEPHONE ENCOUNTER
Spoke to pt using  services  # [id number] for change to time as below  Spoke w/[facility] at [facility] for change and requested not be put to have an in person  for delivery.    CS 7/20/2023 11a   ARRIVAL TIME 9a    Alicia West  Surgery Scheduler

## 2023-05-01 NOTE — PATIENT INSTRUCTIONS
Call the billing office at [phone] or [phone].    Kick Counts  It s normal to worry about your baby s health. One way you can know your baby s doing well is to record the baby s movements once a day. This is called a kick count.   Remember to take your kick count records to all your appointments with your healthcare provider.  How to count kicks    Time how long it takes you to feel 10 kicks, flutters, swishes, or rolls. Ideally, you want to feel at least 10 movements in 2 hours. You will likely feel 10 movements in less time than that.  Starting at 28 weeks, count your baby's movements daily. Follow your healthcare provider's instructions for kick counting. Here are tips for counting kicks:  Choose a time when the baby is active, such as after a meal.   Sit comfortably or lie on your side.   The first time the baby moves, write down the time.   Count each movement until the baby has moved  10 times. This can take from 20 minutes to 2 hours.   If you haven't felt 10 kicks by the end of the second hour, wait a few hours. Then try again.  Try to do it at the same time each day.  When to call your healthcare provider  Call your healthcare provider  right away if:  You do a couple sets of kick counts during the day and your baby moves fewer than 10 times in 2 hours.  Your baby moves much less often than on the days before.  You haven't felt your baby move all day.  [employer] last reviewed this educational content on 8/1/2020 2000-2022 [employer]. All rights reserved. This information is not intended as a substitute for professional medical care. Always follow your healthcare professional's instructions.        Counting Your Baby's Movements   Counting your unborn baby's movements will assure you of your baby's health.   The best time to count is when your baby is most active. Do it at the same time every day.  To keep track of your baby's movements, use the attached chart. Bring the chart  with you to each clinic visit.  Do not smoke for at least 2 hours before counting your baby's movements. (In fact, it's best to quit smoking if you're pregnant.)  Lie on your side. Put your hand on your baby.  Write the time you start counting movements.  Count the next 10 kicks, rolls, and other movements.  Write the time when your baby has finished 10 movements.  If you reach 10 movements within 2 hours, you're done for the day.  Call your care provider right away if:  You feel no movement for the first hour.  It takes more than 2 hours to feel 10 movements.  There's a change in the normal pattern of movements.  Your care provider's phone number is:   ________________________________________  The number to your [facility] birth center is:   ________________________________________     For informational purposes only. Not to replace the advice of your health care provider. Copyright   1991, 2005 [employer] [facility]. All rights reserved. Clinically reviewed by Marissa Patton RN, Maternal-Fetal Medicine Center. [employer] [id number] - REV 10/21.       Pregnancy: Your Third Trimester Changes  As the baby grows, your body changes, too. You may also see signs that your body is getting ready for labor. Be patient. Within a few more weeks, your baby will be born.   How you are changing  Your body is preparing for the birth of your baby. Some of the most common changes are listed below. If you have any questions or concerns, ask your healthcare provider:   You ll gain more weight from fluids, extra blood, and fat deposits.  Your breasts will grow as your body gets ready to feed the baby. They may be more tender. You may also notice a slight yellow or white discharge from the nipples.  Discharge from your vagina may increase. This is normal.  You might see some skin color changes on your forehead, cheeks, or nose. Most of these will go away after you deliver.  How your baby is growing  Month 7  Your baby can open and close  their eyes and weighs around 4 pounds (1.8 kg). If born prematurely (too early), your baby would likely survive with special care.     Month 8  Your baby is building up body fat and weighs around 6 pounds (2.7 kg).    Month 9  Your baby weighs nearly 7 pounds (3.2 kg) and is about 19 to 21 inches long. In other words, any day now...     [employer] last reviewed this educational content on 9/1/2021 2000-2022 [employer]. All rights reserved. This information is not intended as a substitute for professional medical care. Always follow your healthcare professional's instructions.

## 2023-05-08 ENCOUNTER — LAB (OUTPATIENT)
Dept: LAB | Facility: CLINIC | Age: 38
End: 2023-05-08
Payer: COMMERCIAL

## 2023-05-08 DIAGNOSIS — R73.02 IMPAIRED GLUCOSE TOLERANCE: ICD-10-CM

## 2023-05-08 LAB
GESTATIONAL GTT 1 HR POST DOSE: 238 MG/DL (ref 60–179)
GESTATIONAL GTT 2 HR POST DOSE: 172 MG/DL (ref 60–154)
GESTATIONAL GTT 3 HR POST DOSE: 158 MG/DL (ref 60–139)
GLUCOSE P FAST SERPL-MCNC: 93 MG/DL (ref 60–94)

## 2023-05-08 PROCEDURE — 82952 GTT-ADDED SAMPLES: CPT

## 2023-05-08 PROCEDURE — 36415 COLL VENOUS BLD VENIPUNCTURE: CPT

## 2023-05-08 PROCEDURE — 82951 GLUCOSE TOLERANCE TEST (GTT): CPT

## 2023-05-09 DIAGNOSIS — O24.419 GESTATIONAL DIABETES MELLITUS (GDM) IN THIRD TRIMESTER, GESTATIONAL DIABETES METHOD OF CONTROL UNSPECIFIED: Primary | ICD-10-CM

## 2023-05-15 ENCOUNTER — PRENATAL OFFICE VISIT (OUTPATIENT)
Dept: OBGYN | Facility: CLINIC | Age: 38
End: 2023-05-15
Payer: COMMERCIAL

## 2023-05-15 VITALS — SYSTOLIC BLOOD PRESSURE: 102 MMHG | WEIGHT: 148 LBS | BODY MASS INDEX: 24.07 KG/M2 | DIASTOLIC BLOOD PRESSURE: 60 MMHG

## 2023-05-15 DIAGNOSIS — O09.291 HISTORY OF PRE-ECLAMPSIA IN PRIOR PREGNANCY, CURRENTLY PREGNANT IN FIRST TRIMESTER: ICD-10-CM

## 2023-05-15 DIAGNOSIS — O09.523 ENCOUNTER FOR SUPERVISION OF MULTIGRAVIDA OF ADVANCED MATERNAL AGE IN THIRD TRIMESTER: ICD-10-CM

## 2023-05-15 DIAGNOSIS — O24.419 GESTATIONAL DIABETES MELLITUS (GDM) IN THIRD TRIMESTER, GESTATIONAL DIABETES METHOD OF CONTROL UNSPECIFIED: ICD-10-CM

## 2023-05-15 DIAGNOSIS — O09.93 SUPERVISION OF HIGH RISK PREGNANCY IN THIRD TRIMESTER: Primary | ICD-10-CM

## 2023-05-15 PROCEDURE — 99207 PR PRENATAL VISIT: CPT | Performed by: STUDENT IN AN ORGANIZED HEALTH CARE EDUCATION/TRAINING PROGRAM

## 2023-05-15 RX ORDER — FAMOTIDINE 20 MG/1
20 TABLET, FILM COATED ORAL 2 TIMES DAILY
COMMUNITY
Start: 2023-05-10 | End: 2023-05-30

## 2023-05-15 NOTE — PATIENT INSTRUCTIONS
Birth Control Methods  Birth control methods are used to help prevent pregnancy. There are many different methods to choose from. Talk with your healthcare provider about which method is right for you. Be sure to ask your provider how well each one works. Also ask about the benefits, risks, and side effects of each method.   Hormones  Some birth control methods work by releasing hormones such as progestin and estrogen. These methods include hormone implants, hormone shots, the vaginal ring, the patch, and birth control pills. They all work by stopping the release of the egg from the ovary (ovulation). All of these methods work well and can be stopped at any time.     The implant is a small device that needs to be placed in the upper arm by a trained healthcare provider. It works for up to 3 years.    Hormone injections must be repeated every 3 months.    The vaginal ring must be replaced monthly. It can be removed during the fourth week of each cycle.    The patch must be replaced weekly. It's not worn during the fourth week of each cycle.    Birth control pills must be taken every day.  Intrauterine device (IUD)  An IUD is a small, T-shaped device. It must be placed in the uterus by a trained healthcare provider. There are different types of IUDs available. They work by causing changes in the uterus that make it harder for sperm to reach the egg. Depending on the type of IUD you have, it may work for several years or longer. The IUD is a reversible birth control method. This means it can be removed at any time.   Condom  A condom is a sheath that forms a thin barrier between the penis and the vagina. It helps prevent pregnancy by keeping sperm from entering the vagina. When latex condoms are used, they have the added benefit of protecting against most STIs (sexually transmitted infections). Condoms are used each time there is sexual intercourse and should be discarded after each use. Ask your healthcare  provider about the different types of condoms available. These include both the male condom and female condom.   Spermicide  Spermicides come as foams, jellies, creams, suppositories, and tablets.  They help prevent pregnancy by killing sperm. When used alone they are not that reliable. They work best when combined with other birth control methods such as diaphragms and cervical caps.   Sponge, diaphragm, and cervical cap   All of these methods help prevent pregnancy by covering the opening of the uterus (cervix). This prevents sperm from passing through.   The sponge contains spermicide. It can be bought over the counter. The sponge must be left in place for at least 6 hours after the last time you have sex. However, it should not stay in place for more than 24 hours. Discard after use.   The diaphragm and cervical cap must be fitted and prescribed by your healthcare provider. Both are used with spermicide. The diaphragm must be left in place for at least 6 hours after sex. However, it should not stay in place for more than 24 hours. It can be washed and reused. The cervical cap must be left in place for at least 6 hours after sex. However, it should not stay in place for more than 48 hours. It can be washed and reused.   Withdrawal method  This is when the man pulls his penis out of the vagina just before ejaculation ( coming ). This lowers the amount of sperm entering the vagina. Be aware that fluids released just before ejaculation often still contain some sperm, so this method is not as reliable as certain other methods.   Rhythm method   This method is also call natural family planning or fertility awareness. It requires that you know when in your menstrual cycle you are likely to become pregnant. Then you not have sex during those days. This requires careful planning and good discipline. Your healthcare provider can explain more about how this works.   Tubal ligation and vasectomy  These are surgical methods  to prevent pregnancy. Tubal ligation is an option for women. The fallopian tubes are blocked or cut (ligated). This keeps the egg from passing into the uterus or sperm from reaching the egg. Vasectomy is an option for men. The tubes that normally carry sperm to the penis are either closed or blocked. Both tubal ligation and vasectomy are permanent birth control methods. This means reversal is either not possible or unlikely to work. They are good choices for women and men who know that they don't want to have children in the future.   [employer] last reviewed this educational content on 7/1/2020 2000-2022 [employer]. All rights reserved. This information is not intended as a substitute for professional medical care. Always follow your healthcare professional's instructions.

## 2023-05-15 NOTE — PROGRESS NOTES
Prenatal Care    Seen with phone Mandarin .     Naa Yap is a 37 year old  at 29w6d by LMP c/w 6w5d US presenting for routine prenatal care. Left sided knee pain for 4 days. Unsure of what caused it. Never had this before. Has not tried anything for pain. Ambulating without issue. Denies LOF, VB, ctx. +FM.     Conditions affecting pregnancy:  - AMA  - hx of CS x 1   - Hx of preE   - GDMA1    Objective- see flow sheet  Left knee- normal in appearance, normal ROM.     Naa Yap is a 37 year old  at 29w6d presenting for routine ob visit.       ICD-10-CM    1. Supervision of high risk pregnancy in third trimester  O09.93       2. Encounter for supervision of multigravida of advanced maternal age in third trimester  O09.523       3. Gestational diabetes mellitus (GDM) in third trimester, gestational diabetes method of control unspecified  O24.419       4. History of pre-eclampsia in prior pregnancy, currently pregnant in first trimester  O09.291         - Hx of preE   - ASA- rx provided.    - Baseline labs wnl. Repeat as clinically indicated.    - BP normal today       - GDMA1   - DM educator referral placed again. Have not been able to reach patient.    - She will call them back today     Routine care  - First tri labs wnl. NIPT was not covered by insurance, requesting letter for coverage, sent.  AMA- level II today. S/p COVID-19 vaccine-received three doses in [place], last in 2022. S/p Flu vax. Third tri labs, TDAP today. Failed GCT, GTT. Recommend growth US- will order next visit.   - Discussed routine precautions. Discussed management of knee pain.   - Hx of CS x 1: repeat scheduled  11am- scheduled at this time specifically for cultural reasons.   - TWlbs. Pregravid BMI 21. Expect 25-35lbs.    Follow-up in 2 weeks for routine care    Anna Marie June MD, S  05/15/23

## 2023-05-16 ENCOUNTER — VIRTUAL VISIT (OUTPATIENT)
Dept: EDUCATION SERVICES | Facility: CLINIC | Age: 38
End: 2023-05-16
Payer: COMMERCIAL

## 2023-05-16 DIAGNOSIS — O24.419 GESTATIONAL DIABETES: Primary | ICD-10-CM

## 2023-05-16 PROCEDURE — G0108 DIAB MANAGE TRN  PER INDIV: HCPCS | Mod: 93 | Performed by: DIETITIAN, REGISTERED

## 2023-05-16 RX ORDER — BLOOD SUGAR DIAGNOSTIC
STRIP MISCELLANEOUS
Qty: 150 STRIP | Refills: 3 | Status: SHIPPED | OUTPATIENT
Start: 2023-05-16 | End: 2023-06-26

## 2023-05-16 RX ORDER — BLOOD-GLUCOSE METER
1 EACH MISCELLANEOUS 4 TIMES DAILY
Qty: 1 KIT | Refills: 1 | Status: SHIPPED | OUTPATIENT
Start: 2023-05-16 | End: 2023-08-28

## 2023-05-16 RX ORDER — LANCETS
1 EACH MISCELLANEOUS 4 TIMES DAILY
Qty: 200 EACH | Refills: 3 | Status: SHIPPED | OUTPATIENT
Start: 2023-05-16 | End: 2023-06-26

## 2023-05-16 NOTE — LETTER
5/16/2023         RE: Naa Yap  [address]  [facility] [address]        Dear Colleague,    Thank you for referring your patient, Naa Yap, to the [facility]. Please see a copy of my visit note below.      Diabetes Self-Management Education & Support    Type of Service: Telephone Visit    Originating Location (Patient Location): Home  Distant Location (Provider Location): [facility]  Mode of Communication:  Telephone    Telephone Visit Start Time: 9:12-9:34 (22 min)- patient needed to take family member to hospital- will call again later, called 12:20pm-1:04  Telephone Visit End Time (telephone visit stop time):     How would patient like to obtain AVS? MyChart    SUBJECTIVE/OBJECTIVE:  Presents for education related to gestational diabetes.    Accompanied by: Self  Diabetes management related comments/concerns: I stopped eating rice and noodles as I was concerned with high blood sugar  Gestational weeks: 30w0d  Number of previous pregnancies: 1  Previously had Gestational Diabetes: No  Have you ever had thyroid problems or taken thyroid medication?: No  Heart disease, mitral valve prolapse or rheumatic fever?: No  Hypertension : No  High Cholesterol: No  High Triglycerides: No  Do you use tobacco products?: No  Do you drink beer, wine or hard liquor?: No    Cultural Influences/Ethnic Background:  Not  or   Chinese    Estimated Date of Delivery: Jul 25, 2023    1 hour OGTT  Lab Results   Component Value Date    GLU1 146 (H) 05/01/2023         3 hour OGTT    Fasting  Lab Results   Component Value Date    GTTGF 93 05/08/2023       1 hour  Lab Results   Component Value Date    GTTG1 238 (H) 05/08/2023       2 hour  Lab Results   Component Value Date    GTTG2 172 (H) 05/08/2023       3 hour  Lab Results   Component Value Date    GTTG3 158 (H) 05/08/2023       Lifestyle and Health Behaviors:  Exercise:: Yes (works at a )  Days per week of moderate to  strenuous exercise (like a brisk walk): 4  On average, minutes per day of exercise at this level: 60  How intense was your typical exercise? : Light (like stretching or slow walking)  Exercise Minutes per Week: 240  Barrier to exercise: None  Cultural/Mandaeism diet restrictions?: No  Meal planning/habits: None  How many times a week on average do you eat food made away from home (restaurant/take-out)?: 0  Meals include: Breakfast, Lunch, Dinner  Breakfast: Milk and 2 buns  Lunch: Mixed vegetable with sauce  Dinner: bbq beef and lettuce  Snacks: none- prior was making a banana smoothie but stopped  Other: was having some sparkling water and fruit but stopped due to blood sugars  Beverages: Water, Milk  Biggest challenges to healthy eating: Portion control  Pre-amelia vitamin?: Yes  Supplements?: No  Experiencing nausea?: No  Experiencing heartburn?: Yes    Stopped eating rice and noodles as much      Healthy Coping:  Emotional response to diabetes: Ready to learn, Concern for health and well-being  Informal Support system:: Family  Stage of change: ACTION (Actively working towards change)    Current Management:  Taking medications for gestational diabetes?: No    ASSESSMENT:  Current intake is high in carbohydrate at breakfast and low in car    INTERVENTION:  Patient was instructed on Accu-Chek Guide meter and recommended reviewing a video.    Educational topics covered today:  GDM diagnosis, pathophysiology, Risks and Complications of GDM, Means of controlling GDM, Using a Blood Glucose Monitor, Blood Glucose Goals, Logging and Interpreting Glucose Results, Ketone Testing, When to Call a Diabetes Educator or OB Provider, Healthy Eating During Pregnancy, Counting Carbohydrates, Meal Planning for GDM, and Physical Activity    Educational materials provided today:   Yeyo Understanding Gestational Diabetes  GDM Log Book  Sharps Disposal  Care After Delivery    Pt verbalized understanding of concepts discussed and  recommendations provided today.     PLAN:  Check glucose 4 times daily, before breakfast and 1 hour after each meal.     Check Ketones daily for one week, if negative, reduce testing to once a week.     Physical activity recommended: as able.    Meal plan: 30g carbs at breakfast, 45-60g carbs at lunch, 45-60g carbs at supper, 15-30 carbs at 3 snacks a day.  Follow consistent CHO meal plan, eat CHO and protein/fat at all meals/snacks.    [email or web address] message diabetes educator if 3 or more blood sugars are above the goal in 1 week, if ketones are positive, or with questions/concerns.    Emma Valladares MS, RD, LD, CDE  Time Spent: 68 minutes  Encounter Type: Individual    Any diabetes medication dose changes were made via the CDE Protocol and Collaborative Practice Agreement with the patient's OB/GYN provider. A copy of this encounter was shared with the provider.

## 2023-05-16 NOTE — PROGRESS NOTES
Diabetes Self-Management Education & Support    Type of Service: Telephone Visit    Originating Location (Patient Location): Home  Distant Location (Provider Location): [facility]  Mode of Communication:  Telephone    Telephone Visit Start Time: 9:12-9:34 (22 min)- patient needed to take family member to hospital- will call again later, called 12:20pm-1:04  Telephone Visit End Time (telephone visit stop time):     How would patient like to obtain AVS? Sherri    SUBJECTIVE/OBJECTIVE:  Presents for education related to gestational diabetes.    Accompanied by: Self  Diabetes management related comments/concerns: I stopped eating rice and noodles as I was concerned with high blood sugar  Gestational weeks: 30w0d  Number of previous pregnancies: 1  Previously had Gestational Diabetes: No  Have you ever had thyroid problems or taken thyroid medication?: No  Heart disease, mitral valve prolapse or rheumatic fever?: No  Hypertension : No  High Cholesterol: No  High Triglycerides: No  Do you use tobacco products?: No  Do you drink beer, wine or hard liquor?: No    Cultural Influences/Ethnic Background:  Not  or   Chinese    Estimated Date of Delivery: Jul 25, 2023    1 hour OGTT  Lab Results   Component Value Date    GLU1 146 (H) 05/01/2023         3 hour OGTT    Fasting  Lab Results   Component Value Date    GTTGF 93 05/08/2023       1 hour  Lab Results   Component Value Date    GTTG1 238 (H) 05/08/2023       2 hour  Lab Results   Component Value Date    GTTG2 172 (H) 05/08/2023       3 hour  Lab Results   Component Value Date    GTTG3 158 (H) 05/08/2023       Lifestyle and Health Behaviors:  Exercise:: Yes (works at a [employer])  Days per week of moderate to strenuous exercise (like a brisk walk): 4  On average, minutes per day of exercise at this level: 60  How intense was your typical exercise? : Light (like stretching or slow walking)  Exercise Minutes per Week: 240  Barrier to  exercise: None  Cultural/Scientology diet restrictions?: No  Meal planning/habits: None  How many times a week on average do you eat food made away from home (restaurant/take-out)?: 0  Meals include: Breakfast, Lunch, Dinner  Breakfast: Milk and 2 buns  Lunch: Mixed vegetable with sauce  Dinner: bbq beef and lettuce  Snacks: none- prior was making a banana smoothie but stopped  Other: was having some sparkling water and fruit but stopped due to blood sugars  Beverages: Water, Milk  Biggest challenges to healthy eating: Portion control  Pre- vitamin?: Yes  Supplements?: No  Experiencing nausea?: No  Experiencing heartburn?: Yes    Stopped eating rice and noodles as much      Healthy Coping:  Emotional response to diabetes: Ready to learn, Concern for health and well-being  Informal Support system:: Family  Stage of change: ACTION (Actively working towards change)    Current Management:  Taking medications for gestational diabetes?: No    ASSESSMENT:  Current intake is high in carbohydrate at breakfast and low in car    INTERVENTION:  Patient was instructed on Accu-Chek Guide meter and recommended reviewing a video.    Educational topics covered today:  GDM diagnosis, pathophysiology, Risks and Complications of GDM, Means of controlling GDM, Using a Blood Glucose Monitor, Blood Glucose Goals, Logging and Interpreting Glucose Results, Ketone Testing, When to Call a Diabetes Educator or OB Provider, Healthy Eating During Pregnancy, Counting Carbohydrates, Meal Planning for GDM, and Physical Activity    Educational materials provided today:   Yeyo Understanding Gestational Diabetes  GDM Log Book  Sharps Disposal  Care After Delivery    Pt verbalized understanding of concepts discussed and recommendations provided today.     PLAN:  Check glucose 4 times daily, before breakfast and 1 hour after each meal.     Check Ketones daily for one week, if negative, reduce testing to once a week.     Physical activity  recommended: as able.    Meal plan: 30g carbs at breakfast, 45-60g carbs at lunch, 45-60g carbs at supper, 15-30 carbs at 3 snacks a day.  Follow consistent CHO meal plan, eat CHO and protein/fat at all meals/snacks.    Call/e-mail/MyChart message diabetes educator if 3 or more blood sugars are above the goal in 1 week, if ketones are positive, or with questions/concerns.    Emma Valladares MS, RD, LD, CDE  Time Spent: 68 minutes  Encounter Type: Individual    Any diabetes medication dose changes were made via the CDE Protocol and Collaborative Practice Agreement with the patient's OB/GYN provider. A copy of this encounter was shared with the provider.

## 2023-05-26 ENCOUNTER — VIRTUAL VISIT (OUTPATIENT)
Dept: EDUCATION SERVICES | Facility: CLINIC | Age: 38
End: 2023-05-26
Payer: COMMERCIAL

## 2023-05-26 ENCOUNTER — TELEPHONE (OUTPATIENT)
Dept: EDUCATION SERVICES | Facility: CLINIC | Age: 38
End: 2023-05-26

## 2023-05-26 DIAGNOSIS — O24.414 INSULIN CONTROLLED GESTATIONAL DIABETES MELLITUS (GDM) IN THIRD TRIMESTER: Primary | ICD-10-CM

## 2023-05-26 DIAGNOSIS — O24.419 GESTATIONAL DIABETES: Primary | ICD-10-CM

## 2023-05-26 PROCEDURE — G0108 DIAB MANAGE TRN  PER INDIV: HCPCS | Mod: 93 | Performed by: DIETITIAN, REGISTERED

## 2023-05-26 NOTE — PROGRESS NOTES
Diabetes Self-Management Education & Support  Type of Service: Telephone Visit    Originating Location (Patient Location): Home  Distant Location (Provider Location): Offsite  Mode of Communication:  Telephone    Telephone Visit Start Time: 10 am  Telephone Visit End Time (telephone visit stop time): 11:05am    How would patient like to obtain AVS? Sherri    SUBJECTIVE/OBJECTIVE:  Presents for education related to gestational diabetes.    Accompanied by: Self  Diabetes management related comments/concerns: I stopped eating rice and noodles as I was concerned with high blood sugar  Gestational weeks: 31w3d  Number of previous pregnancies: 1  Previously had Gestational Diabetes: No  Have you ever had thyroid problems or taken thyroid medication?: No  Heart disease, mitral valve prolapse or rheumatic fever?: No  Hypertension : No  High Cholesterol: No  High Triglycerides: No  Do you use tobacco products?: No  Do you drink beer, wine or hard liquor?: No    Cultural Influences/Ethnic Background:  Not  or     LMP 10/18/2022 (Exact Date)       Estimated Date of Delivery: Jul 25, 2023    Blood Glucose/Ketone Log:    Date Ketones Fasting Post Breakfast Post Lunch Post Supper   5/26  96      5/25  104 105*  188   5/24  102 132 104 144   5/23  107 175 152 111   5/22  104 118 106 98   5/21  103 127  109   5/20  89 112  124   5/19  95 166  142   5/18  114 153 111 105   5/17  101 124 177 146       Lifestyle and Health Behaviors:  Exercise:: Yes (works at a )  Days per week of moderate to strenuous exercise (like a brisk walk): 4  On average, minutes per day of exercise at this level: 60  How intense was your typical exercise? : Light (like stretching or slow walking)  Exercise Minutes per Week: 240  Barrier to exercise: None  Cultural/Hoahaoism diet restrictions?: No  Meal planning/habits: None  How many times a week on average do you eat food made away from home (restaurant/take-out)?: 0  Meals include:  Breakfast, Lunch, Dinner  Breakfast: Milk and 2 buns  Lunch: Mixed vegetable with sauce  Dinner: bbq beef and lettuce  Snacks: none- prior was making a banana smoothie but stopped  Other: was having some sparkling water and fruit but stopped due to blood sugars  Beverages: Water, Milk  Biggest challenges to healthy eating: Portion control  Pre- vitamin?: Yes  Supplements?: No  Experiencing nausea?: No  Experiencing heartburn?: Yes    Breakfast: 9:00 am: 3 eggs fried (shared with son) with milk and fruits (peach, orange, tomatoes, banana) OR - used  To have chinese bread and yogurt   Lunch: 2:00 pm: Salad, mushrooms, carrots, veggies, beans with chili oil and soy sauce and instant noodles   Dinner: 9:00 pm: high readings are after 2 bowls of rice and potato stew with pork ribs OR fried chicken with watermelon OR when BG is lower- she has roasted meat and vegetables (finished dinner around 10 pm  Snacks: biscuit cookies, fruits (only having one snack throughout the day)     Healthy Coping:  Emotional response to diabetes: Ready to learn, Concern for health and well-being  Informal Support system:: Family  Stage of change: ACTION (Actively working towards change)    Current Management:  Taking medications for gestational diabetes?: No    ASSESSMENT:  Ketones: .   Fasting blood glucoses: 20% in target.  After breakfast: 66% in target.  After lunch: 60% in target.  After dinner: 55% in target.    Discussed insulin start recommendation, Naa is quite hesitant and fearful of injecting insulin, but ultimately agreeable. Writer provided reassurance that insulin is safe and effective. Provided resources for learning insulin injection technique. Sent a [employer] message with some resources and recommended that she ask the pharmacist for demonstration when she picks up the medication.     INTERVENTION:  Educational topics covered today:  Reviewed BG and insulin start.     Educational Materials provided today:  Yeyo  Preventing Diabetes    PLAN:    Recommend insulin start: NPH: 6 units at bedtime (0.1 units/kg)  - Routing to provider for approval     Check glucose 4 times daily.  Check ketones once a week when readings are consistently negative.  Continue with recommended physical activity.  Continue to follow recommended meal plan: 2-3 carbs at breakfast, 3-4 carbs at lunch, 3-4 carbs at supper, 1-2 carbs at snacks.  Follow consistent CHO meal plan, eat CHO and protein/fat at all meals/snacks.    Call/e-mail/Sensinodehart message diabetes educator if 3 or more blood sugars are above the goal in 1 week or if ketones are positive.    Sabrina Abraham RD [facility]  Triage: [phone]  [phone]    Time Spent: 65 minutes  Encounter Type: Individual    Any diabetes medication dose changes were made via the CDE Protocol and Collaborative Practice Agreement with the patient's referring provider. A copy of this encounter was shared with the provider.

## 2023-05-26 NOTE — CONFIDENTIAL NOTE
Naa Malone's fasting BG have been elevated 80% of the last week. Recommend insulin start. PLease let me know if you agree with plan and sign pended order, or indicate alternate plan.     Recommend: NPH (humulin N or Novolin N) 6 units at bedtime     PHILIP Batista [facility]  Triage: [phone]  [phone]

## 2023-05-26 NOTE — LETTER
5/26/2023         RE: Naa Yap  [address]  [place] [address]        Dear Colleague,    Thank you for referring your patient, Naa Yap, to the [facility] [place]. Please see a copy of my visit note below.    Diabetes Self-Management Education & Support  Type of Service: Telephone Visit    Originating Location (Patient Location): Home  Distant Location (Provider Location): Offsite  Mode of Communication:  Telephone    Telephone Visit Start Time: 10 am  Telephone Visit End Time (telephone visit stop time): 11:05am    How would patient like to obtain AVS? MyChart    SUBJECTIVE/OBJECTIVE:  Presents for education related to gestational diabetes.    Accompanied by: Self  Diabetes management related comments/concerns: I stopped eating rice and noodles as I was concerned with high blood sugar  Gestational weeks: 31w3d  Number of previous pregnancies: 1  Previously had Gestational Diabetes: No  Have you ever had thyroid problems or taken thyroid medication?: No  Heart disease, mitral valve prolapse or rheumatic fever?: No  Hypertension : No  High Cholesterol: No  High Triglycerides: No  Do you use tobacco products?: No  Do you drink beer, wine or hard liquor?: No    Cultural Influences/Ethnic Background:  Not  or     LMP 10/18/2022 (Exact Date)       Estimated Date of Delivery: Jul 25, 2023    Blood Glucose/Ketone Log:    Date Ketones Fasting Post Breakfast Post Lunch Post Supper   5/26  96      5/25  104 105*  188   5/24  102 132 104 144   5/23  107 175 152 111   5/22  104 118 106 98   5/21  103 127  109   5/20  89 112  124   5/19  95 166  142   5/18  114 153 111 105   5/17  101 124 177 146       Lifestyle and Health Behaviors:  Exercise:: Yes (works at a )  Days per week of moderate to strenuous exercise (like a brisk walk): 4  On average, minutes per day of exercise at this level: 60  How intense was your typical exercise? : Light (like stretching or slow  walking)  Exercise Minutes per Week: 240  Barrier to exercise: None  Cultural/Tenriism diet restrictions?: No  Meal planning/habits: None  How many times a week on average do you eat food made away from home (restaurant/take-out)?: 0  Meals include: Breakfast, Lunch, Dinner  Breakfast: Milk and 2 buns  Lunch: Mixed vegetable with sauce  Dinner: bbq beef and lettuce  Snacks: none- prior was making a banana smoothie but stopped  Other: was having some sparkling water and fruit but stopped due to blood sugars  Beverages: Water, Milk  Biggest challenges to healthy eating: Portion control  Pre-amelia vitamin?: Yes  Supplements?: No  Experiencing nausea?: No  Experiencing heartburn?: Yes    Breakfast: 9:00 am: 3 eggs fried (shared with son) with milk and fruits (peach, orange, tomatoes, banana) OR - used  To have chinese bread and yogurt   Lunch: 2:00 pm: Salad, mushrooms, carrots, veggies, beans with chili oil and soy sauce and instant noodles   Dinner: 9:00 pm: high readings are after 2 bowls of rice and potato stew with pork ribs OR fried chicken with watermelon OR when BG is lower- she has roasted meat and vegetables (finished dinner around 10 pm  Snacks: biscuit cookies, fruits (only having one snack throughout the day)     Healthy Coping:  Emotional response to diabetes: Ready to learn, Concern for health and well-being  Informal Support system:: Family  Stage of change: ACTION (Actively working towards change)    Current Management:  Taking medications for gestational diabetes?: No    ASSESSMENT:  Ketones: .   Fasting blood glucoses: 20% in target.  After breakfast: 66% in target.  After lunch: 60% in target.  After dinner: 55% in target.    Discussed insulin start recommendation, Naa is quite hesitant and fearful of injecting insulin, but ultimately agreeable. Writer provided reassurance that insulin is safe and effective. Provided resources for learning insulin injection technique. Sent a [employer] message with  some resources and recommended that she ask the pharmacist for demonstration when she picks up the medication.     INTERVENTION:  Educational topics covered today:  Reviewed BG and insulin start.     Educational Materials provided today:  Yeyo Preventing Diabetes    PLAN:    Recommend insulin start: NPH: 6 units at bedtime (0.1 units/kg)  - Routing to provider for approval     Check glucose 4 times daily.  Check ketones once a week when readings are consistently negative.  Continue with recommended physical activity.  Continue to follow recommended meal plan: 2-3 carbs at breakfast, 3-4 carbs at lunch, 3-4 carbs at supper, 1-2 carbs at snacks.  Follow consistent CHO meal plan, eat CHO and protein/fat at all meals/snacks.    Call/e-mail/MyChart message diabetes educator if 3 or more blood sugars are above the goal in 1 week or if ketones are positive.    Sabrina Abraham RD LD [facility]  Triage: [phone]  [phone]    Time Spent: 65 minutes  Encounter Type: Individual    Any diabetes medication dose changes were made via the CDE Protocol and Collaborative Practice Agreement with the patient's referring provider. A copy of this encounter was shared with the provider.

## 2023-05-30 ENCOUNTER — PRENATAL OFFICE VISIT (OUTPATIENT)
Dept: OBGYN | Facility: CLINIC | Age: 38
End: 2023-05-30
Payer: COMMERCIAL

## 2023-05-30 VITALS — SYSTOLIC BLOOD PRESSURE: 108 MMHG | DIASTOLIC BLOOD PRESSURE: 76 MMHG | WEIGHT: 150.2 LBS | BODY MASS INDEX: 24.43 KG/M2

## 2023-05-30 DIAGNOSIS — K21.9 GASTROESOPHAGEAL REFLUX DISEASE WITHOUT ESOPHAGITIS: ICD-10-CM

## 2023-05-30 DIAGNOSIS — O24.414 INSULIN CONTROLLED GESTATIONAL DIABETES MELLITUS (GDM) IN THIRD TRIMESTER: ICD-10-CM

## 2023-05-30 DIAGNOSIS — O09.291 HISTORY OF PRE-ECLAMPSIA IN PRIOR PREGNANCY, CURRENTLY PREGNANT IN FIRST TRIMESTER: ICD-10-CM

## 2023-05-30 DIAGNOSIS — O09.93 SUPERVISION OF HIGH RISK PREGNANCY IN THIRD TRIMESTER: Primary | ICD-10-CM

## 2023-05-30 PROCEDURE — 99207 PR PRENATAL VISIT: CPT | Performed by: STUDENT IN AN ORGANIZED HEALTH CARE EDUCATION/TRAINING PROGRAM

## 2023-05-30 RX ORDER — FAMOTIDINE 20 MG/1
20 TABLET, FILM COATED ORAL 2 TIMES DAILY
Qty: 90 TABLET | Refills: 3 | Status: SHIPPED | OUTPATIENT
Start: 2023-05-30 | End: 2023-08-28

## 2023-05-30 RX ORDER — PRENATAL VIT/IRON FUM/FOLIC AC 27MG-0.8MG
1 TABLET ORAL DAILY
Qty: 90 TABLET | Refills: 3 | Status: SHIPPED | OUTPATIENT
Start: 2023-05-30

## 2023-05-30 RX ORDER — INSULIN HUMAN 100 [IU]/ML
6 INJECTION, SUSPENSION SUBCUTANEOUS AT BEDTIME
Qty: 15 ML | Refills: 1 | OUTPATIENT
Start: 2023-05-30 | End: 2023-05-30

## 2023-05-30 RX ORDER — FOLIC ACID 1 MG/1
1 TABLET ORAL DAILY
Qty: 90 TABLET | Refills: 3 | Status: SHIPPED | OUTPATIENT
Start: 2023-05-30 | End: 2023-08-28

## 2023-05-30 NOTE — PATIENT INSTRUCTIONS
Schedule twice weekly BPP, starting with one BPP this week.     Understanding  Labor  Going into labor before week 37 of pregnancy is called  labor.  labor can cause your baby to be born too soon. This can lead to health problems for your baby.     Before labor, the cervix is thick and closed.      In  labor, the cervix begins to efface (thin) and dilate (open).     Symptoms of  labor  If you think you re having  labor, get medical help right away. Contractions alone don t mean you re in  labor. What matters more are changes in your cervix. The cervix is the opening at the lower end of the uterus. Symptoms of  labor include:    4 or more contractions per hour    Strong contractions    Constant menstrual-like cramping    Low-back pain    Mucous or bloody fluid from the vagina    Bleeding or spotting in the second or third trimester  Evaluating  labor  Your healthcare provider will try to find out if you re in  labor or just having contractions. They may watch you for a few hours. You may have these tests:    Pelvic exam. This is to see if your cervix has effaced (thinned) and dilated (opened).    Uterine activity monitoring. This is used to detect contractions.    Fetal monitoring. This is done to check the health of your baby.    Ultrasound. This test looks at your baby s size and position.    Amniocentesis. This test checks how mature your baby s lungs are.  Caring for yourself at home  If you have  contractions, but your cervix is still thick and closed, your healthcare provider may tell you to:    Drink plenty of water.    Do fewer activities.    Rest in bed on your side.    Don't have intercourse or stimulate your nipples.  When to call your healthcare provider  Call your healthcare provider if you have any of these:    4 or more contractions per hour    Bag of water breaks    Bleeding or spotting  If you need hospital care    labor often means that you need hospital care. You may need complete bed rest. You may have an IV (intravenous) line in your arm or hand. This is to give you fluids. You may be given pills or injections. These are done to help prevent contractions. You may get a medicine called a corticosteroid. This is to help your baby s lungs mature more quickly.  Are you at risk?  Any pregnant woman can have  labor. It may start for no reason. But these risk factors can increase your chances:    Past  labor or early birth    Smoking, drug, or alcohol use in pregnancy    A multiple pregnancy (twins or more)    Problems with the shape of the uterus    Bleeding during the pregnancy  The dangers of  birth  A baby born too soon may have health problems. This is because the baby didn t have enough time to grow. Some of the risks for your baby include:    Not breastfeeding or feeding well    Having immature lungs    Bleeding in the brain    Death  Reaching term  Your goal is to get as close to term (week 37 or later) as you can before giving birth. The closer you get to term, the higher your chance of having a healthy baby. Work with your healthcare provider. Together, you can take steps that may keep you from giving birth too early.  [employer] last reviewed this educational content on 10/1/2021    4349-6915 [employer]. All rights reserved. This information is not intended as a substitute for professional medical care. Always follow your healthcare professional's instructions.          Adapting to Pregnancy: Third Trimester  Although common during pregnancy, some discomforts may seem worse in the final weeks. Simple lifestyle changes can help. Take care of yourself. And ask your partner to help out with small tasks.   Limiting leg problems  Ways to combat leg issues:    Wear support hose all day.    Don't wear snug shoes or clothes that bind, such as tight pants and socks with elastic tops.    Sit  with your feet and legs raised often.  Caring for your breasts  Tips to follow include:    Wash with plain water. Don't use harsh soaps or rubbing alcohol. They may cause dryness.    Wear a nursing bra for extra support. It can also hide any leaks from your nipples.  Controlling hemorrhoids  Ways to prevent hemorrhoids include:     Eat foods that are high in fiber. Also exercise and drink enough fluids. This will reduce constipation and hemorrhoids.    Sleep and nap on your side. This limits pressure on the veins of your rectum.    Try not to stand or sit for long periods.  Controlling back pain  As your body changes during pregnancy, your back must work in new ways. Back pain has many causes. Physical changes in your body can strain your back and its supporting muscles. Also hormones increase during pregnancy. This can affect how your muscles and joints work together. All of these changes can lead to pain.   Pain may be felt in the upper or lower back. Pain is also common in the pelvis. Some pregnant women have sciatica. This is pain caused by pressure on the sciatic nerve running down the back of the leg. Ice or heat may help. Your provider may advise massage therapy or a chiropractor. Sleep on your left side with a pillow between your knees. Use a brace or support device. Ask your provider for specific tips and exercises to help control your back pain.   Tips to help you rest  Good rest and sleep will help you feel better. Here are some ideas:     Ask your partner to massage your shoulders, neck, or back.    Limit the errands you do each day.    Lie down in the afternoon or after work for a few minutes.    Take a warm bath before you go to sleep.    Drink warm milk or teas without caffeine.    Don't drink coffee, black tea, and cola.  Stopping heartburn    Don't eat spicy, greasy, fried, or acidic foods.    Eat small amounts more often. Eat slowly.   Wait 2 hours after eating before lying down.    Sleep with your  upper body raised 6 inches.   Managing mood swings  Ways to manage mood swings include:     Know that mood changes are normal.    Exercise often, but get plenty of rest.    Address any concerns and limit stress. Talking to your partner, other women, or your healthcare provider may help.   Dealing with urinary frequency  Tips to deal with having to urinate often include:     Drink plenty of water all day. But if you drink a lot in the evening, you may have to get up more in the night.    Limit coffee, black tea, and cola.  How daily issues affect your health  Many things in your daily life impact your health. This can include transportation, money problems, housing, access to food, and . If you can t get to medical appointments, you may not receive the care you need. When money is tight, it may be difficult to pay for medicines. And living far from a grocery store can make it hard to buy healthy food.   If you have concerns in any of these or other areas, talk with your healthcare team. They may know of local resources to assist you. Or they may have a staff person who can help.   [employer] last reviewed this educational content on 7/1/2021 2000-2022 [employer]. All rights reserved. This information is not intended as a substitute for professional medical care. Always follow your healthcare professional's instructions.        You have been provided the Any Day Now: Early Labor at Home document.    Additional copies can be found here:  [email or web address]  You have been provided the What I'd Wish I'd Known About Giving Birth document.    Additional copies can be found here:  [email or web address]

## 2023-05-30 NOTE — TELEPHONE ENCOUNTER
I didn't have an rx pended to sign prior to the weekend. I filled it for her today, and signed the rx for needles.

## 2023-05-30 NOTE — PROGRESS NOTES
Prenatal Care (32w0d)    Seen with phone Mandarin .     Naa Yap is a 37 year old  at 32w0d by LMP c/w 6w5d US presenting for routine prenatal care. Denies LOF, VB, ctx. +FM.   Elevated BG. Recommended to start on insulin- no rx yet.     Conditions affecting pregnancy:  - AMA  - hx of CS x 1   - Hx of preE   - GDMA2    Objective- see flow sheet    Naa Yap is a 37 year old  at 32w0d presenting for routine ob visit.       ICD-10-CM    1. Supervision of high risk pregnancy in third trimester  O09.93 folic acid (FOLVITE) 1 MG tablet      2. History of pre-eclampsia in prior pregnancy, currently pregnant in first trimester  O09.291 Aspirin 81 MG CAPS      3. Insulin controlled gestational diabetes mellitus (GDM) in third trimester  O24.414 US Fetal Biophys Prof w/o Non Stress Test     insulin  UNIT/ML vial      4. Gastroesophageal reflux disease without esophagitis  K21.9 famotidine (PEPCID) 20 MG tablet        - Hx of preE   - ASA- rx provided. Refill provided.   - Baseline labs wnl. Repeat as clinically indicated.    - BP normal today       - GDMA2   - Insulin rx'ed today.    - Twice weekly BPPs ordered, need to schedule    - Growth US scheduled with [facility] next week.    - Delivery timing: RCS 39w2d    Routine care  - First tri labs wnl. NIPT was not covered by insurance, requesting letter for coverage, sent.  AMA- level II today. S/p COVID-19 vaccine-received three doses in [place], last in 2022. S/p Flu vax. Third tri labs, TDAP today. Failed GCT, GTT. Recommend growth US- will order next visit.   - Discussed routine precautions. Refills for all meds provided .  - Hx of CS x 1: repeat scheduled  11am- scheduled at this time specifically for cultural reasons.   - TWlbs. Pregravid BMI 21. Expect 25-35lbs.    Follow-up in 2 weeks for routine care    Anna Marie June MD, S  23

## 2023-05-31 ENCOUNTER — VIRTUAL VISIT (OUTPATIENT)
Dept: EDUCATION SERVICES | Facility: CLINIC | Age: 38
End: 2023-05-31
Payer: COMMERCIAL

## 2023-05-31 DIAGNOSIS — O24.414 INSULIN CONTROLLED GESTATIONAL DIABETES MELLITUS (GDM) IN THIRD TRIMESTER: Primary | ICD-10-CM

## 2023-05-31 PROCEDURE — 98968 PH1 ASSMT&MGMT NQHP 21-30: CPT | Mod: 95 | Performed by: DIETITIAN, REGISTERED

## 2023-05-31 RX ORDER — INSULIN HUMAN 100 [IU]/ML
6 INJECTION, SUSPENSION SUBCUTANEOUS AT BEDTIME
Qty: 15 ML | Refills: 0 | Status: SHIPPED | OUTPATIENT
Start: 2023-05-31 | End: 2023-06-08

## 2023-05-31 RX ORDER — INSULIN HUMAN 100 [IU]/ML
6 INJECTION, SUSPENSION SUBCUTANEOUS AT BEDTIME
Qty: 15 ML | Refills: 1 | Status: SHIPPED | OUTPATIENT
Start: 2023-05-31 | End: 2023-05-31

## 2023-05-31 NOTE — LETTER
5/31/2023         RE: Naa Yap  [id number]  [place] [place] [facility] [address]        Dear Colleague,    Thank you for referring your patient, Naa Yap, to the [facility]. Please see a copy of my visit note below.    Gestational Diabetes Follow-up    Type of Service: Telephone Visit    Originating Location (Patient Location): Home  Distant Location (Provider Location): [facility]  Mode of Communication:  Telephone    Telephone Visit Start Time: 1:30 pm  Telephone Visit End Time (telephone visit stop time): 2:10 pm    How would patient like to obtain AVS? MyChart        Subjective/Objective:    With Mandarin     Naa Yap sent in blood glucose log for review. Last date of communication was: 5/26/23.    Gestational diabetes is being managed with diet and activity    Taking diabetes medications: no - has not started yet as there was a delay in getting insulin prescription and needles.   Now has vial of insulin and insulin pen needles?    Estimated Date of Delivery: Jul 25, 2023    BG/Food Log:   Date Breakfast  Lunch  Dinner  Bedtime    Before After Before After Before After    5/31 94 112        5/30 108 114  129  141    5/29 116 135    105    5/28 97 115  142  133    5/27 98 118  137  115          Assessment:    Pen needles were ordered with vial of insulin?  Pt really doesn't want to use a syringe and draw up insulin.  Her insurance doesn't seen to cover insulin pens.  Got Humulin N one time free box sent to pharmacy and confirmed pharmacist will also work on PA on insulin boxes after that if needed.    Ketones: reports more on negative trace end now  Fasting blood glucoses: 20% in target.  After breakfast: 100% in target.  Before lunch: x% in target.  After lunch: 67% in target.  Before dinner: x% in target.  After dinner: 75% in target.    Plan/Response:    Pt to  Humulin N free box tomorrow (pharmacy doesn't have available until tomorrow) and start  6 units nightly.      Follow up in 1 week to review blood sugars and potential insulin adjustment      Any diabetes medication dose changes were made via the CDE Protocol and Collaborative Practice Agreement with the patient's referring provider. A copy of this encounter was shared with the provider.    40 minutes    PHILIP Vanegas [facility]  [phone]

## 2023-05-31 NOTE — PROGRESS NOTES
Gestational Diabetes Follow-up    Type of Service: Telephone Visit    Originating Location (Patient Location): Home  Distant Location (Provider Location): [facility]  Mode of Communication:  Telephone    Telephone Visit Start Time: 1:30 pm  Telephone Visit End Time (telephone visit stop time): 2:10 pm    How would patient like to obtain AVS? Sherri        Subjective/Objective:    With Mandarin     Naa Yap sent in blood glucose log for review. Last date of communication was: 5/26/23.    Gestational diabetes is being managed with diet and activity    Taking diabetes medications: no - has not started yet as there was a delay in getting insulin prescription and needles.   Now has vial of insulin and insulin pen needles?    Estimated Date of Delivery: Jul 25, 2023    BG/Food Log:   Date Breakfast  Lunch  Dinner  Bedtime    Before After Before After Before After    5/31 94 112        5/30 108 114  129  141    5/29 116 135    105    5/28 97 115  142  133    5/27 98 118  137  115          Assessment:    Pen needles were ordered with vial of insulin?  Pt really doesn't want to use a syringe and draw up insulin.  Her insurance doesn't seen to cover insulin pens.  Got Humulin N one time free box sent to pharmacy and confirmed pharmacist will also work on PA on insulin boxes after that if needed.    Ketones: reports more on negative trace end now  Fasting blood glucoses: 20% in target.  After breakfast: 100% in target.  Before lunch: x% in target.  After lunch: 67% in target.  Before dinner: x% in target.  After dinner: 75% in target.    Plan/Response:    Pt to  Humulin N free box tomorrow (pharmacy doesn't have available until tomorrow) and start 6 units nightly.      Follow up in 1 week to review blood sugars and potential insulin adjustment      Any diabetes medication dose changes were made via the CDE Protocol and Collaborative Practice Agreement with the patient's referring  provider. A copy of this encounter was shared with the provider.    40 minutes    Zamzam Will RD [facility]  [phone]

## 2023-06-02 ENCOUNTER — ANCILLARY PROCEDURE (OUTPATIENT)
Dept: ULTRASOUND IMAGING | Facility: CLINIC | Age: 38
End: 2023-06-02
Attending: STUDENT IN AN ORGANIZED HEALTH CARE EDUCATION/TRAINING PROGRAM
Payer: COMMERCIAL

## 2023-06-02 DIAGNOSIS — O24.414 INSULIN CONTROLLED GESTATIONAL DIABETES MELLITUS (GDM) IN THIRD TRIMESTER: ICD-10-CM

## 2023-06-02 PROCEDURE — 76819 FETAL BIOPHYS PROFIL W/O NST: CPT

## 2023-06-05 ENCOUNTER — HOSPITAL ENCOUNTER (OUTPATIENT)
Dept: ULTRASOUND IMAGING | Facility: CLINIC | Age: 38
Discharge: HOME | End: 2023-06-05
Attending: OBSTETRICS & GYNECOLOGY
Payer: COMMERCIAL

## 2023-06-05 ENCOUNTER — OFFICE VISIT (OUTPATIENT)
Dept: MATERNAL FETAL MEDICINE | Facility: CLINIC | Age: 38
End: 2023-06-05
Attending: OBSTETRICS & GYNECOLOGY
Payer: COMMERCIAL

## 2023-06-05 DIAGNOSIS — O35.EXX0 PYELECTASIS OF FETUS ON PRENATAL ULTRASOUND: Primary | ICD-10-CM

## 2023-06-05 DIAGNOSIS — O35.EXX0 PYELECTASIS OF FETUS ON PRENATAL ULTRASOUND: ICD-10-CM

## 2023-06-05 PROCEDURE — 76816 OB US FOLLOW-UP PER FETUS: CPT | Mod: 26 | Performed by: OBSTETRICS & GYNECOLOGY

## 2023-06-05 PROCEDURE — 76816 OB US FOLLOW-UP PER FETUS: CPT

## 2023-06-05 PROCEDURE — 76819 FETAL BIOPHYS PROFIL W/O NST: CPT | Mod: 26 | Performed by: OBSTETRICS & GYNECOLOGY

## 2023-06-05 PROCEDURE — 76819 FETAL BIOPHYS PROFIL W/O NST: CPT

## 2023-06-05 NOTE — NURSING NOTE
Mandarin IPAD  used for [facility] ultrasound and office visit.  Patient reports positive fetal movement, no pain, no contractions, leaking of fluid, or bleeding.  Reports blood sugar values fasting elevated- today 102 and 1 hr post prandial today 137.  Currently using 6 units insulin at bedtime. Patient denies headache, visual changes, nausea/vomiting, epigastric pain related to preeclampsia.  Education provided to patient on biophysical profile.  SBAR given to M MD, see their note in Epic.    Message sent to pediatric urology -  Dr. Reyes would like this patient to have a Pediatric Urology Consult for the diagnosis of left UTDA1.     Her EDC is 07/25/2023.  She is currently 32.6 weeks gestation and should be scheduled for a consult first available.      Please call patient to schedule.    Please call us at [phone] if you have any questions or need more information.  Maternal-Fetal Medicine Team

## 2023-06-06 NOTE — PROGRESS NOTES
"Please see \"imaging\" tab under \"Chart Review\" for details of today's US at the [facility].    Gurvinder Reyes MD  Maternal-Fetal Medicine    "

## 2023-06-07 ENCOUNTER — VIRTUAL VISIT (OUTPATIENT)
Dept: PEDIATRICS | Facility: CLINIC | Age: 38
End: 2023-06-07
Attending: NURSE PRACTITIONER
Payer: COMMERCIAL

## 2023-06-07 DIAGNOSIS — O35.EXX0 PYELECTASIS OF FETUS ON PRENATAL ULTRASOUND: ICD-10-CM

## 2023-06-07 PROCEDURE — 99442 PR PHYSICIAN TELEPHONE EVALUATION 11-20 MIN: CPT | Mod: 95 | Performed by: NURSE PRACTITIONER

## 2023-06-07 ASSESSMENT — PAIN SCALES - GENERAL: PAINLEVEL: NO PAIN (0)

## 2023-06-07 NOTE — PATIENT INSTRUCTIONS
[facility]   Department of Pediatric Urology    Dr. Nino Hooker, Pediatric Urologist  Aaron Hernandez, CPNP  OR[facility] Rivera    [facility]  Nurse Coordinator: Gracy Valerio [phone]    [facility]  Nurse Coordinator: [phone]    [facility]  Nurse Coordinator: Gracy Valerio [phone]      [place]  Nurse Coordinator: SAMSON Carrillo, [phone]      Once your baby is born, please do the following:    -After you have gone home, please call the Nurse Coordinator at your preferred  location and give her your baby s name and date of birth. She will  help coordinate the tests that need to be done about 4 weeks  after birth.    Your baby s test may include:  -Renal Bladder Ultrasound  (all locations)  - Voiding Cystourethrogram (VCUG)  ([facility])  -Mag 3 Lasix Renogram  ([facility])

## 2023-06-07 NOTE — PROGRESS NOTES
Gurvinder Reyes MD  [address]  [place] [address]    RE:  Naa Yap  :  1985  [place] MRN:  [id number]  Date of visit:  2023    Dear Dr. Reyes:    I had the pleasure of speaking with your patient, Naa, today through the [facility] in urology consultation for the question of prenatally detected pyelectasis. Please see below the details of this visit and my impression and plans discussed with the family.        CC:  Prenatal consult    HPI:  Naa Yap is a 37 year old woman whom I was asked to see in consultation for the above. This is Naa's 9th pregnancy. The sex of the fetus is male. At the 32 week ultrasound mild left pyelectasis persisted (UTD A1). So far the pregnancy has been OK, other than high blood sugars. Naa is planning to deliver at [facility] with a scheduled repeat  on 23. There is no family history of genitourinary disorders in childhood.    PMH:  Reviewed, GDM, multiple miscarriages, pre eclampsia   Past Medical History:   Diagnosis Date     Known health problems: none        PSH:     Past Surgical History:   Procedure Laterality Date      SECTION         Meds, allergies, family history, social history reviewed per intake form and confirmed in our EMR.    ROS:  Negative on a 12-point scale, except for gestational diabetes. All other pertinent positives mentioned in the HPI.    PE: Telephone encounter  Last menstrual period 10/18/2022, not currently breastfeeding.  There is no height or weight on file to calculate BMI.      Impression:  Prenatally detected fetal mild L pyelectasis (UTD A1)    Plan:    We discussed the likely prenatal causes for this, including prenatal obstructive issues that have already resolved versus those that may need surgical help with resolution in childhood, as well as the possibility of vesicoureteral reflux. We discussed the risks for urinary tract infection,  and the pros and cons of starting the baby on daily, low-dose Amoxicillin, dosed at 10 mg/kg/d, which in this case we will likely not do. We also made our plans for follow-up after the baby is born with renal/bladder ultrasound in 2-4 weeks. I addressed all questions and encouraged a phone call from their MFM if there are any future concerns during the pregnancy.    Thank you very much for allowing me the opportunity to participate in this nice family's care with you.    Phone call duration: 14 minutes (12:55-13:09)    I spent a total of 20 minutes on the date of encounter doing chart review, history, documentation, and further activities as noted above.      Sincerely,  TALIA Guzman, CNP  Pediatric Urology  [facility]

## 2023-06-07 NOTE — NURSING NOTE
Is the patient currently in the state of [place]? YES    Visit mode:TELEPHONE    If the visit is dropped, the patient can be reconnected by: TELEPHONE VISIT: Phone number: [phone]    Will anyone else be joining the visit? NO      How would you like to obtain your AVS? MyChart    Are changes needed to the allergy or medication list? NO    Reason for visit: Consult

## 2023-06-08 DIAGNOSIS — O24.414 INSULIN CONTROLLED GESTATIONAL DIABETES MELLITUS (GDM) IN THIRD TRIMESTER: ICD-10-CM

## 2023-06-08 RX ORDER — INSULIN HUMAN 100 [IU]/ML
8 INJECTION, SUSPENSION SUBCUTANEOUS AT BEDTIME
Qty: 15 ML | Refills: 0 | Status: ON HOLD
Start: 2023-06-08 | End: 2023-07-13

## 2023-06-08 NOTE — PROGRESS NOTES
Naa Yap canceled our gestational diabetes education telephone visit yesterday because she had another appointment.    Talked to patient today via Mandarin .  FBG continues above target of 60-95. Increased NPH to 8 units before bedtime. Follow-up telephone visit in one week.     Blood Glucose/Ketone Log:    Date Ketones Fasting Post Breakfast Post Lunch Post Supper   6/1 N  104  138  142  102   6/2 N  106  145  127  136   6/3 N  100  131  139  123   6/4 N  106  125  128  121   6/5 N  102  131  140  146   6/6 N  92  143  120  141   6/7 N  92  145  130  105   6/8 N  96           Not billed for services as this was not a scheduled telephone visit.    Emely Robles RD, LD, [facility]   Certified Diabetes Care and   [facility]

## 2023-06-09 ENCOUNTER — ANCILLARY PROCEDURE (OUTPATIENT)
Dept: ULTRASOUND IMAGING | Facility: CLINIC | Age: 38
End: 2023-06-09
Attending: STUDENT IN AN ORGANIZED HEALTH CARE EDUCATION/TRAINING PROGRAM
Payer: COMMERCIAL

## 2023-06-09 PROCEDURE — 76819 FETAL BIOPHYS PROFIL W/O NST: CPT

## 2023-06-12 ENCOUNTER — ANCILLARY PROCEDURE (OUTPATIENT)
Dept: ULTRASOUND IMAGING | Facility: CLINIC | Age: 38
End: 2023-06-12
Attending: STUDENT IN AN ORGANIZED HEALTH CARE EDUCATION/TRAINING PROGRAM
Payer: COMMERCIAL

## 2023-06-12 ENCOUNTER — VIRTUAL VISIT (OUTPATIENT)
Dept: OBGYN | Facility: CLINIC | Age: 38
End: 2023-06-12
Payer: COMMERCIAL

## 2023-06-12 DIAGNOSIS — O09.291 HISTORY OF PRE-ECLAMPSIA IN PRIOR PREGNANCY, CURRENTLY PREGNANT IN FIRST TRIMESTER: Primary | ICD-10-CM

## 2023-06-12 DIAGNOSIS — O24.414 INSULIN CONTROLLED GESTATIONAL DIABETES MELLITUS (GDM) IN THIRD TRIMESTER: ICD-10-CM

## 2023-06-12 DIAGNOSIS — O09.93 SUPERVISION OF HIGH RISK PREGNANCY IN THIRD TRIMESTER: ICD-10-CM

## 2023-06-12 PROCEDURE — 76819 FETAL BIOPHYS PROFIL W/O NST: CPT | Performed by: STUDENT IN AN ORGANIZED HEALTH CARE EDUCATION/TRAINING PROGRAM

## 2023-06-12 PROCEDURE — 99207 PR PRENATAL VISIT: CPT | Performed by: STUDENT IN AN ORGANIZED HEALTH CARE EDUCATION/TRAINING PROGRAM

## 2023-06-12 RX ORDER — ASPIRIN 81 MG/1
TABLET, COATED ORAL
COMMUNITY
Start: 2023-05-30 | End: 2023-06-26

## 2023-06-12 NOTE — PROGRESS NOTES
Follow Up (Follow up ultrasound )    Naa Yap is a 37 year old female who is being evaluated via a billable telephone visit.      What phone number would you like to be contacted at? [phone]  How would you like to obtain your AVS? Sherri    Originating Location (pt. Location): home      Distant Location (provider location):  On-site    Seen with phone Mandarin .     Naa Yap is a 37 year old  at 33w6d by LMP c/w 6w5d US presenting for routine prenatal care via phone visit. Denies LOF, VB, ctx. +FM. On insulin NPH 8u at bedtime, BG are under good control.     Conditions affecting pregnancy:  - AMA  - hx of CS x 1   - Hx of preE   - GDMA2  - Fetal pyelectasis     Objective- see flow sheet    Naa Yap is a 37 year old  at 33w6d presenting for routine ob visit.       ICD-10-CM    1. History of pre-eclampsia in prior pregnancy, currently pregnant in first trimester  O09.291       2. Insulin controlled gestational diabetes mellitus (GDM) in third trimester  O24.414       3. Supervision of high risk pregnancy in third trimester  O09.93         - Hx of preE   - ASA- rx provided. Refill provided.   - Baseline labs wnl. Repeat as clinically indicated.    - BP not checked today- phone visit. Will have her return for BP check Friday at the time of BPP.     - GDMA2   - continue insulin as prescribed, follow-up with diabetes educatoin    - Twice weekly BPPs scheduled    - Growth US: 32w6d EFW 39%ile, UTD A1     - Repeat in 4 weeks    - Delivery timing: RCS 39w2d    - Fetal pyelectasis   - S/p peds uro consult     Routine care  - First tri labs wnl. NIPT was not covered by insurance, requesting letter for coverage, sent.  AMA- s/p level II- pyelectasis. S/p COVID-19 vaccine-received three doses in [place], last in 2022. S/p Flu vax. Third tri labs: failed GCT/GTT.   - Discussed routine precautions. Refills for all meds provided .  - Hx of CS x 1: repeat scheduled  11am- scheduled at this  time specifically for cultural reasons.   - TWG: Pregravid BMI 21. Expect 25-35lbs.    Follow-up in 2 weeks for routine care.    Total phone time: 7 minutes    Anna Marie June MD, M[facility]  06/12/23

## 2023-06-14 ENCOUNTER — VIRTUAL VISIT (OUTPATIENT)
Dept: EDUCATION SERVICES | Facility: CLINIC | Age: 38
End: 2023-06-14
Payer: COMMERCIAL

## 2023-06-14 DIAGNOSIS — O24.414 INSULIN CONTROLLED GESTATIONAL DIABETES MELLITUS (GDM) IN THIRD TRIMESTER: Primary | ICD-10-CM

## 2023-06-14 PROCEDURE — G0108 DIAB MANAGE TRN  PER INDIV: HCPCS | Performed by: REGISTERED NURSE

## 2023-06-14 NOTE — LETTER
2023         RE: Naa Yap  [id number]  [facility] [address]        Dear Colleague,    Thank you for referring your patient, Naa Yap, to the [facility]. Please see a copy of my visit note below.    Diabetes Self-Management Education & Support  Type of Service: Telephone Visit  Language line Mandrin : [place], [place] [id number]    Originating Location (Patient Location): Home  Distant Location (Provider Location): Offsite  Mode of Communication:  Telephone    Telephone Visit Start Time: 9.29 AM  Telephone Visit End Time (telephone visit stop time):10:03 AM    How would patient like to obtain AVS? MyChart     ASSESSMENT:  1 week follow-up telephone visit for gestational diabetes education and counseling. Naa is checking her blood sugar 4 times per day.  She had 1 blood sugar above target in the past week due to food choices.  Taking 8 units of NPH at bedtime.  Instructed on symptoms and treatment of low blood sugar (less 70).    Fasting blood glucoses: 100% in target.  After breakfast: 100% in target.  After lunch: 100% in target.  After dinner: 80% in target.    Blood Glucose Log:   Date Fasting Post Breakfast Post Lunch Post Supper     92   113  124  130   6/10  96  127  131  125     86  102  130  161 after eating a large portion of noodles and juice     95  114  125  106     92  98  114  126     86           SUBJECTIVE/OBJECTIVE:  Presents for education related to gestational diabetes.    Accompanied by: Self,   Diabetes management related comments/concerns: none  Gestational weeks: 34w1d  Hospital planned for delivery: [facility]  Next OB Visit Date: 23  Number of previous pregnancies: 1 living ()  Previously had Gestational Diabetes: No  Have you ever had thyroid problems or taken thyroid medication?: No  Heart disease, mitral valve prolapse or rheumatic fever?: No  Hypertension : No  High Cholesterol: No  High  Triglycerides: No  Do you use tobacco products?: No  Do you drink beer, wine or hard liquor?: No    Cultural Influences/Ethnic Background:  Not  or       Estimated Date of Delivery: 2023.   scheduled for 2023  LMP 10/18/2022 (Exact Date)       Wt Readings from Last 3 Encounters:   23 68.1 kg (150 lb 3.2 oz)   05/15/23 67.1 kg (148 lb)   23 67.1 kg (148 lb)     Weight gain approximately 20 lbs at 34 weeks gestation.    Lifestyle and Health Behaviors:  Pre-pregnancy weight (lbs): 130  Exercise:: Yes (walking, gardening, playing with kids)  Days per week of moderate to strenuous exercise (like a brisk walk): 7  On average, minutes per day of exercise at this level: 30  How intense was your typical exercise? : Light (like stretching or slow walking)  Exercise Minutes per Week: 210  Barrier to exercise: None  Cultural/Restorationism diet restrictions?: No  Meal planning/habits: Avoiding sweets, Smaller portions, Frequent snacking  How many times a week on average do you eat food made away from home (restaurant/take-out)?: 0  Meals include: Breakfast, Lunch, Dinner, Morning Snack, Afternoon Snack, Evening Snack  Breakfast: eggs and milk  Lunch: small amount of noodles, meat, vegetales and fruit  Dinner: meat, a little white rice, vegetables  Snacks: nuts, chips, crackers  Other: drinking a lot of water (1.5 liters/fday)  Beverages: Water, Milk  Biggest challenges to healthy eating: Portion control  Pre-amelia vitamin?: Yes  Supplements?: No  Experiencing nausea?: No  Experiencing heartburn?: Yes    Healthy Coping:  Emotional response to diabetes: Ready to learn, Concern for health and well-being  Informal Support system:: Family  Stage of change: ACTION (Actively working towards change)    Current Management:  Taking medications for gestational diabetes?: Yes  Difficulty affording diabetes medication?: No (NPH free 1st fill)   Difficulty affording diabetes testing  supplies?: No    INTERVENTION:  Educational topics covered today:  Symptoms and treatment of hypoglycemia  Current food and exercise habits  Blood sugar interpretation    Educational Materials provided today:  Picture guide for treating hypoglycemia attached to patient instructions    PLAN:  Continue 8 units of NPH insulin at bedtime  Check glucose 4 times daily as usual   Check ketones once a week when readings are consistently negative.  Continue with recommended physical activity.  Continue to follow recommended meal plan: 3 meals and 3 snacks per day    Call/e-mail/MyChart message diabetes educator if 3 or more blood sugars are above the goal in 1 week or if ketones are positive.    Diabetes self-management training ()  Time Spent: 30 minutes  Encounter Type: Individual    Any diabetes medication dose changes were made via the CDE Protocol and Collaborative Practice Agreement with the patient's OB/GYN provider. A copy of this encounter was shared with the provider.

## 2023-06-14 NOTE — PROGRESS NOTES
Diabetes Self-Management Education & Support  Type of Service: Telephone Visit  Language gabby Ellen : [place], [place] [id number]    Originating Location (Patient Location): Home  Distant Location (Provider Location): Offsite  Mode of Communication:  Telephone    Telephone Visit Start Time: 9.29 AM  Telephone Visit End Time (telephone visit stop time):10:03 AM    How would patient like to obtain AVS? Laurohart     ASSESSMENT:  1 week follow-up telephone visit for gestational diabetes education and counseling. Naa is checking her blood sugar 4 times per day.  She had 1 blood sugar above target in the past week due to food choices.  Taking 8 units of NPH at bedtime.  Instructed on symptoms and treatment of low blood sugar (less 70).    Fasting blood glucoses: 100% in target.  After breakfast: 100% in target.  After lunch: 100% in target.  After dinner: 80% in target.    Blood Glucose Log:   Date Fasting Post Breakfast Post Lunch Post Supper     92   113  124  130   6/10  96  127  131  125     86  102  130  161 after eating a large portion of noodles and juice     95  114  125  106     92  98  114  126     86           SUBJECTIVE/OBJECTIVE:  Presents for education related to gestational diabetes.    Accompanied by: Self,   Diabetes management related comments/concerns: none  Gestational weeks: 34w1d  Hospital planned for delivery: [facility]  Next OB Visit Date: 23  Number of previous pregnancies: 1 living ()  Previously had Gestational Diabetes: No  Have you ever had thyroid problems or taken thyroid medication?: No  Heart disease, mitral valve prolapse or rheumatic fever?: No  Hypertension : No  High Cholesterol: No  High Triglycerides: No  Do you use tobacco products?: No  Do you drink beer, wine or hard liquor?: No    Cultural Influences/Ethnic Background:  Not  or       Estimated Date of Delivery: 2023.   scheduled  for 2023  LMP 10/18/2022 (Exact Date)       Wt Readings from Last 3 Encounters:   23 68.1 kg (150 lb 3.2 oz)   05/15/23 67.1 kg (148 lb)   23 67.1 kg (148 lb)     Weight gain approximately 20 lbs at 34 weeks gestation.    Lifestyle and Health Behaviors:  Pre-pregnancy weight (lbs): 130  Exercise:: Yes (walking, gardening, playing with kids)  Days per week of moderate to strenuous exercise (like a brisk walk): 7  On average, minutes per day of exercise at this level: 30  How intense was your typical exercise? : Light (like stretching or slow walking)  Exercise Minutes per Week: 210  Barrier to exercise: None  Cultural/Baptism diet restrictions?: No  Meal planning/habits: Avoiding sweets, Smaller portions, Frequent snacking  How many times a week on average do you eat food made away from home (restaurant/take-out)?: 0  Meals include: Breakfast, Lunch, Dinner, Morning Snack, Afternoon Snack, Evening Snack  Breakfast: eggs and milk  Lunch: small amount of noodles, meat, vegetales and fruit  Dinner: meat, a little white rice, vegetables  Snacks: nuts, chips, crackers  Other: drinking a lot of water (1.5 liters/fday)  Beverages: Water, Milk  Biggest challenges to healthy eating: Portion control  Pre-amelia vitamin?: Yes  Supplements?: No  Experiencing nausea?: No  Experiencing heartburn?: Yes    Healthy Coping:  Emotional response to diabetes: Ready to learn, Concern for health and well-being  Informal Support system:: Family  Stage of change: ACTION (Actively working towards change)    Current Management:  Taking medications for gestational diabetes?: Yes  Difficulty affording diabetes medication?: No (NPH free 1st fill)   Difficulty affording diabetes testing supplies?: No    INTERVENTION:  Educational topics covered today:  Symptoms and treatment of hypoglycemia  Current food and exercise habits  Blood sugar interpretation    Educational Materials provided today:  Picture guide for treating  hypoglycemia attached to patient instructions    PLAN:  Continue 8 units of NPH insulin at bedtime  Check glucose 4 times daily as usual   Check ketones once a week when readings are consistently negative.  Continue with recommended physical activity.  Continue to follow recommended meal plan: 3 meals and 3 snacks per day    Call/e-mail/MyChart message diabetes educator if 3 or more blood sugars are above the goal in 1 week or if ketones are positive.    Diabetes self-management training ()  Time Spent: 30 minutes  Encounter Type: Individual    Any diabetes medication dose changes were made via the CDE Protocol and Collaborative Practice Agreement with the patient's OB/GYN provider. A copy of this encounter was shared with the provider.

## 2023-06-14 NOTE — Clinical Note
2023         RE: Naa Yap  [address]  [facility] [address]        Dear Colleague,    Thank you for referring your patient, Naa Yap, to the [facility]. Please see a copy of my visit note below.    Diabetes Self-Management Education & Support  Type of Service: Telephone Visit  Language line Mandrin : [place], [place] [id number]    Originating Location (Patient Location): Home  Distant Location (Provider Location): Offsite  Mode of Communication:  Telephone    Telephone Visit Start Time: 9.29 AM  Telephone Visit End Time (telephone visit stop time):10:03 AM    How would patient like to obtain AVS? MyChart     ASSESSMENT:  1 week follow-up telephone visit for gestational diabetes education and counseling. Naa is checking her blood sugar 4 times per day.  She had 1 blood sugar above target in the past week due to food choices.  Taking 8 units of NPH at bedtime.  Instructed on symptoms and treatment of low blood sugar (less 70).    Fasting blood glucoses: 100% in target.  After breakfast: 100% in target.  After lunch: 100% in target.  After dinner: 80% in target.    Blood Glucose Log:   Date Fasting Post Breakfast Post Lunch Post Supper     92   113  124  130   6/10  96  127  131  125     86  102  130  161 after eating a large portion of noodles and juice     95  114  125  106     92  98  114  126     86           SUBJECTIVE/OBJECTIVE:  Presents for education related to gestational diabetes.    Accompanied by: Self,   Diabetes management related comments/concerns: none  Gestational weeks: 34w1d  Hospital planned for delivery: [facility]  Next OB Visit Date: 23  Number of previous pregnancies: 1 living ()  Previously had Gestational Diabetes: No  Have you ever had thyroid problems or taken thyroid medication?: No  Heart disease, mitral valve prolapse or rheumatic fever?: No  Hypertension : No  High Cholesterol: No  High  Triglycerides: No  Do you use tobacco products?: No  Do you drink beer, wine or hard liquor?: No    Cultural Influences/Ethnic Background:  Not  or       Estimated Date of Delivery: 2023.   scheduled for 2023  LMP 10/18/2022 (Exact Date)       Wt Readings from Last 3 Encounters:   23 68.1 kg (150 lb 3.2 oz)   05/15/23 67.1 kg (148 lb)   23 67.1 kg (148 lb)     Weight gain approximately 20 lbs at 34 weeks gestation.    Lifestyle and Health Behaviors:  Pre-pregnancy weight (lbs): 130  Exercise:: Yes (walking, gardening, playing with kids)  Days per week of moderate to strenuous exercise (like a brisk walk): 7  On average, minutes per day of exercise at this level: 30  How intense was your typical exercise? : Light (like stretching or slow walking)  Exercise Minutes per Week: 210  Barrier to exercise: None  Cultural/Shinto diet restrictions?: No  Meal planning/habits: Avoiding sweets, Smaller portions, Frequent snacking  How many times a week on average do you eat food made away from home (restaurant/take-out)?: 0  Meals include: Breakfast, Lunch, Dinner, Morning Snack, Afternoon Snack, Evening Snack  Breakfast: eggs and milk  Lunch: small amount of noodles, meat, vegetales and fruit  Dinner: meat, a little white rice, vegetables  Snacks: nuts, chips, crackers  Other: drinking a lot of water (1.5 liters/fday)  Beverages: Water, Milk  Biggest challenges to healthy eating: Portion control  Pre-amelia vitamin?: Yes  Supplements?: No  Experiencing nausea?: No  Experiencing heartburn?: Yes    Healthy Coping:  Emotional response to diabetes: Ready to learn, Concern for health and well-being  Informal Support system:: Family  Stage of change: ACTION (Actively working towards change)    Current Management:  Taking medications for gestational diabetes?: Yes  Difficulty affording diabetes medication?: No (NPH free 1st fill)   Difficulty affording diabetes testing  supplies?: No    INTERVENTION:  Educational topics covered today:  Symptoms and treatment of hypoglycemia  Current food and exercise habits  Blood sugar interpretation    Educational Materials provided today:  Picture guide for treating hypoglycemia attached to patient instructions    PLAN:  Continue 8 units of NPH insulin at bedtime  Check glucose 4 times daily as usual   Check ketones once a week when readings are consistently negative.  Continue with recommended physical activity.  Continue to follow recommended meal plan: 3 meals and 3 snacks per day    Call/e-mail/MyChart message diabetes educator if 3 or more blood sugars are above the goal in 1 week or if ketones are positive.    Diabetes self-management training ()  Time Spent: 30 minutes  Encounter Type: Individual    Any diabetes medication dose changes were made via the CDE Protocol and Collaborative Practice Agreement with the patient's OB/GYN provider. A copy of this encounter was shared with the provider.

## 2023-06-14 NOTE — PATIENT INSTRUCTIONS
1. Continue 8 units of NPH at bedtime.    2. Continue to check blood sugar 4 times per day. Before breakfast and 1 hour after meals.        Blood sugar goals:       Before breakfast: less 95      1 hour after meals: less 140      2 hours after meals: less 120    3. Continue to eat 3 meals and 3 snacks per day. Limit rice and noodles to 1 cup per meal. Avoid drinking juice.    4. Continue to stay active after meals to manage blood sugars.    5. Next follow-up in 2 weeks.  Will need instruction on care after delivery.    7. Call Diabetes Care or send a SMRxT message if you have 3 blood sugars above target in one week.    Diabetes Care: [phone]

## 2023-06-16 ENCOUNTER — ANCILLARY PROCEDURE (OUTPATIENT)
Dept: ULTRASOUND IMAGING | Facility: CLINIC | Age: 38
End: 2023-06-16
Attending: STUDENT IN AN ORGANIZED HEALTH CARE EDUCATION/TRAINING PROGRAM
Payer: COMMERCIAL

## 2023-06-16 ENCOUNTER — ALLIED HEALTH/NURSE VISIT (OUTPATIENT)
Dept: OBGYN | Facility: CLINIC | Age: 38
End: 2023-06-16
Payer: COMMERCIAL

## 2023-06-16 VITALS — DIASTOLIC BLOOD PRESSURE: 74 MMHG | SYSTOLIC BLOOD PRESSURE: 114 MMHG | HEART RATE: 72 BPM

## 2023-06-16 DIAGNOSIS — O09.92 SUPERVISION OF HIGH RISK PREGNANCY IN SECOND TRIMESTER: Primary | ICD-10-CM

## 2023-06-16 PROCEDURE — 99207 PR NO CHARGE NURSE ONLY: CPT

## 2023-06-16 PROCEDURE — 76819 FETAL BIOPHYS PROFIL W/O NST: CPT | Performed by: OBSTETRICS & GYNECOLOGY

## 2023-06-16 NOTE — PROGRESS NOTES
Naa is here for BP check after her BPP (8 out of 8)    C/S scheduled for 7/20  Pt of Byraiah  GDM insulin hx of preE  Taking ASA    Feeling well, no concerns.    BP in office today   114/74  P 72    Cari Pena RN on 6/16/2023 at 12:01 PM

## 2023-06-19 ENCOUNTER — ANCILLARY PROCEDURE (OUTPATIENT)
Dept: ULTRASOUND IMAGING | Facility: CLINIC | Age: 38
End: 2023-06-19
Attending: STUDENT IN AN ORGANIZED HEALTH CARE EDUCATION/TRAINING PROGRAM
Payer: COMMERCIAL

## 2023-06-19 PROCEDURE — 76819 FETAL BIOPHYS PROFIL W/O NST: CPT | Performed by: STUDENT IN AN ORGANIZED HEALTH CARE EDUCATION/TRAINING PROGRAM

## 2023-06-23 ENCOUNTER — ANCILLARY PROCEDURE (OUTPATIENT)
Dept: ULTRASOUND IMAGING | Facility: CLINIC | Age: 38
End: 2023-06-23
Attending: STUDENT IN AN ORGANIZED HEALTH CARE EDUCATION/TRAINING PROGRAM
Payer: COMMERCIAL

## 2023-06-23 PROCEDURE — 76819 FETAL BIOPHYS PROFIL W/O NST: CPT | Performed by: OBSTETRICS & GYNECOLOGY

## 2023-06-26 ENCOUNTER — VIRTUAL VISIT (OUTPATIENT)
Dept: EDUCATION SERVICES | Facility: CLINIC | Age: 38
End: 2023-06-26
Attending: STUDENT IN AN ORGANIZED HEALTH CARE EDUCATION/TRAINING PROGRAM
Payer: COMMERCIAL

## 2023-06-26 ENCOUNTER — ANCILLARY PROCEDURE (OUTPATIENT)
Dept: ULTRASOUND IMAGING | Facility: CLINIC | Age: 38
End: 2023-06-26
Attending: STUDENT IN AN ORGANIZED HEALTH CARE EDUCATION/TRAINING PROGRAM
Payer: COMMERCIAL

## 2023-06-26 ENCOUNTER — PRENATAL OFFICE VISIT (OUTPATIENT)
Dept: OBGYN | Facility: CLINIC | Age: 38
End: 2023-06-26
Payer: COMMERCIAL

## 2023-06-26 VITALS — BODY MASS INDEX: 25.86 KG/M2 | WEIGHT: 159 LBS | SYSTOLIC BLOOD PRESSURE: 114 MMHG | DIASTOLIC BLOOD PRESSURE: 64 MMHG

## 2023-06-26 DIAGNOSIS — O09.93 SUPERVISION OF HIGH RISK PREGNANCY IN THIRD TRIMESTER: Primary | ICD-10-CM

## 2023-06-26 DIAGNOSIS — O09.291 HISTORY OF PRE-ECLAMPSIA IN PRIOR PREGNANCY, CURRENTLY PREGNANT IN FIRST TRIMESTER: ICD-10-CM

## 2023-06-26 DIAGNOSIS — O24.414 INSULIN CONTROLLED GESTATIONAL DIABETES MELLITUS (GDM) IN THIRD TRIMESTER: ICD-10-CM

## 2023-06-26 DIAGNOSIS — O34.219 HISTORY OF CESAREAN SECTION COMPLICATING PREGNANCY: ICD-10-CM

## 2023-06-26 DIAGNOSIS — O24.419 GESTATIONAL DIABETES MELLITUS (GDM) IN THIRD TRIMESTER, GESTATIONAL DIABETES METHOD OF CONTROL UNSPECIFIED: ICD-10-CM

## 2023-06-26 PROCEDURE — 99207 PR PRENATAL VISIT: CPT | Performed by: STUDENT IN AN ORGANIZED HEALTH CARE EDUCATION/TRAINING PROGRAM

## 2023-06-26 PROCEDURE — 87653 STREP B DNA AMP PROBE: CPT | Performed by: STUDENT IN AN ORGANIZED HEALTH CARE EDUCATION/TRAINING PROGRAM

## 2023-06-26 PROCEDURE — G0108 DIAB MANAGE TRN  PER INDIV: HCPCS | Mod: 95

## 2023-06-26 PROCEDURE — 76819 FETAL BIOPHYS PROFIL W/O NST: CPT | Performed by: STUDENT IN AN ORGANIZED HEALTH CARE EDUCATION/TRAINING PROGRAM

## 2023-06-26 RX ORDER — LANCETS
1 EACH MISCELLANEOUS 4 TIMES DAILY
Qty: 200 EACH | Refills: 3 | Status: SHIPPED | OUTPATIENT
Start: 2023-06-26 | End: 2023-08-28

## 2023-06-26 RX ORDER — BLOOD SUGAR DIAGNOSTIC
STRIP MISCELLANEOUS
Qty: 150 STRIP | Refills: 3 | Status: SHIPPED | OUTPATIENT
Start: 2023-06-26 | End: 2023-08-28

## 2023-06-26 NOTE — PROGRESS NOTES
Prenatal Care    Seen with phone Mandarin .     Naa Yap is a 37 year old  at 35w6d by LMP c/w 6w5d US presenting for routine prenatal care. Some LE swelling. Pelvic pressure. Denies LOF, VB, ctx. +FM.   GDMA2: On insulin 8u NPH at bedtime. BG are okay with this regimen.     Conditions affecting pregnancy:  - AMA  - hx of CS x 1   - Hx of preE   - GDMA2  - Fetal renal pyelectasis     Objective- see flow sheet    Naa Yap is a 37 year old  at 35w6d presenting for routine ob visit.       ICD-10-CM    1. Supervision of high risk pregnancy in third trimester  O09.93 Group B strep PCR      2. Insulin controlled gestational diabetes mellitus (GDM) in third trimester  O24.414 US OB >14 Weeks Follow Up     blood glucose (ACCU-CHEK GUIDE) test strip     blood glucose monitoring (SOFTCLIX) lancets      3. History of  section complicating pregnancy  O34.219       4. History of pre-eclampsia in prior pregnancy, currently pregnant in first trimester  O09.291       5. Gestational diabetes  O24.419         - Hx of preE   - ASA- rx provided. Refill provided.   - Baseline labs wnl. Repeat as clinically indicated.    - BP normal today, no signs/symptoms.     - GDMA2   - On insulin. Refill for strips and needles provided.    - Twice weekly BPPs scheduled,  today normal MVP.    - Growth US 33w: EFW 39%ile, Repeat ordered.    - Delivery timing: RCS 39w2d    - Fetal pyelectasis   - Persistent on today's US, 9mm   - S/p peds uro, plan post- follow-up.     Routine care  - First tri labs wnl. NIPT was not covered by insurance, requesting letter for coverage, sent.  AMA- s/p level II: fetal pyelectasis. S/p COVID-19 vaccine-received three doses in [place], last in 2022. S/p Flu vax, TDAP. Third tri labs:  Failed GCT, GTT. GBS today.  - Discussed routine precautions. Refills for all meds provided .  - Hx of CS x 1: repeat scheduled  11am- scheduled at this time specifically for cultural  reasons.   - TWlbs. Pregravid BMI 21. Expect 25-35lbs.    Follow-up weekly for routine care    Anna Marie June MD, MHS  23

## 2023-06-26 NOTE — LETTER
2023         RE: Naa Yap  [address]  [facility] [address]        Dear Colleague,    Thank you for referring your patient, Naa Yap, to the [facility]. Please see a copy of my visit note below.     Diabetes Self-Management Education & Support  Type of Service: Telephone Visit    Originating Location (Patient Location): Home  Distant Location (Provider Location): Offsite  Mode of Communication:  Telephone    Telephone Visit Start Time: 145 PM  Telephone Visit End Time (telephone visit stop time): 230 PM    How would patient like to obtain AVS? MyChart    SUBJECTIVE/OBJECTIVE:  Presents for education related to gestational diabetes. Attends this visit with Mandarin , ID # [id number].  States no one in her house reads English (unable to send my chart message with reminders)    Accompanied by: , Self  Diabetes management related comments/concerns: questions about which fruit she can eat  Gestational weeks: 35w6d  Hospital planned for delivery: [facility] OB Visit Date: 23  Number of previous pregnancies: 1 ( (1 living))  Previously had Gestational Diabetes: No  Have you ever had thyroid problems or taken thyroid medication?: No  Heart disease, mitral valve prolapse or rheumatic fever?: No  Hypertension : No  High Cholesterol: No  High Triglycerides: No  Do you use tobacco products?: No  Do you drink beer, wine or hard liquor?: No    Cultural Influences/Ethnic Background:  Not  or     LMP 10/18/2022 (Exact Date)       Estimated Date of Delivery: 2023    Blood Glucose/Ketone Log:     Date Ketones Fasting Post Breakfast Post Lunch Post Supper     89 113 121      106 -- 122 92     100 121 138 141     101 123 127 106     96 128 133 108     95 137 142 127     95 136 138 130       Lifestyle and Health Behaviors:  Pre-pregnancy weight (lbs): 130  Exercise:: Yes (walking, gardening, playing with  "kids)  Days per week of moderate to strenuous exercise (like a brisk walk): 7  On average, minutes per day of exercise at this level: 30  How intense was your typical exercise?: Light (like stretching or slow walking)  Exercise Minutes per Week: 210  Barrier to exercise: None  Cultural/Taoist diet restrictions?: No  Meal planning/habits: Avoiding sweets, Smaller portions, Frequent snacking  How many times a week on average do you eat food made away from home (restaurant/take-out)?: 0  Meals include: Breakfast, Lunch, Dinner, Morning Snack, Afternoon Snack, Evening Snack  Breakfast: eggs and milk  Lunch: small amount of noodles, meat, vegetables and fruit  Dinner: meat, a little white rice, vegetables  Snacks: nuts, chips, crackers  Other: drinking a lot of water (1.5 liters/fday)  Beverages: Water, Milk  Biggest challenges to healthy eating: Portion control  Pre- vitamin?: Yes  Supplements?: No  Experiencing nausea?: No  Experiencing heartburn?: Yes    Healthy Coping:  Emotional response to diabetes: Ready to learn, Concern for health and well-being  Informal Support system: Family  Stage of change: ACTION (Actively working towards change)    Current Management:  Taking medications for gestational diabetes?: Yes  Difficulty affording diabetes medication?: No (NPH free 1st fill)  Difficulty affording diabetes testing supplies?: No    ASSESSMENT:  Ketones: Not testing recently   Fasting blood glucoses: 43% in target. SEE BELOW  After breakfast: 100% in target.  After lunch: 86% in target.  After dinner: 83% in target.    Naa states \"I was using an insulin pen for longer than 42 days. I started a new insulin pen recently. My diet has not been as well controlled, I have been eating more noodles, fruit and bread.\" Naa states if she cuts back on noodles, she will be able to get enough to eat. \"If I eat less noodles, I will be eating more vegetables and protein. I like eating fruit and have been eating more " "watermelon.\" Naa states she typically will have fruit between meals. States she likes to eat a lot at one time, more than half of a small watermelon or 1 Lb of cherries, for example.     INTERVENTION:  Educational topics covered today:  (Asked her to write down the BG targets and testing times for after delivery.)   What to expect after delivery, When to test BG after delivery and targets. Future testing for Type 2 diabetes (2 hour OGTT at 6 week post-partum check-up and annual fasting blood glucose level), Risk of GDM and planning ahead for future pregnancies, Recommended lifestyle interventions for reducing the risk of Type 2 Diabetes, When to Call a Diabetes Educator or OB Provider.      PLAN:  Check glucose 4 times daily.  Check ketones once a week when readings are consistently negative.  Continue with recommended physical activity.  Continue to follow GDM recommended meal plan. Enc to have fruit as a snack between meals and to combine with protein (she does like nuts), examples reviewed. Anticipate fasting BG to remain in target with starting a new insulin pen and decreasing carbohydrate that she has recently been eating more of.     [email or web address] message diabetes educator if 3 or more blood sugars are above the goal in 1 week or if ketones are positive. Specifically reviewed calling in the next 3 to 4 days if 2 or more fasting BG are high. Gave her the diabetes triage phone number.     FREEDOM Wallace RDN, [place]    Time Spent: 45 minutes  Encounter Type: Individual    Any diabetes medication dose changes were made via the CDE Protocol and Collaborative Practice Agreement with the patient's referring provider. A copy of this encounter was shared with the provider.      FREEDOM Wallace RDN, [place]        "

## 2023-06-26 NOTE — PROGRESS NOTES
Diabetes Self-Management Education & Support  Type of Service: Telephone Visit    Originating Location (Patient Location): Home  Distant Location (Provider Location): Offsite  Mode of Communication:  Telephone    Telephone Visit Start Time: 145 PM  Telephone Visit End Time (telephone visit stop time): 230 PM    How would patient like to obtain AVS? Sherri    SUBJECTIVE/OBJECTIVE:  Presents for education related to gestational diabetes. Attends this visit with Mandarin , ID # [id number].  States no one in her house reads English (unable to send my chart message with reminders)    Accompanied by: , Self  Diabetes management related comments/concerns: questions about which fruit she can eat  Gestational weeks: 35w6d  Hospital planned for delivery: [facility] OB Visit Date: 23  Number of previous pregnancies: 1 ( (1 living))  Previously had Gestational Diabetes: No  Have you ever had thyroid problems or taken thyroid medication?: No  Heart disease, mitral valve prolapse or rheumatic fever?: No  Hypertension : No  High Cholesterol: No  High Triglycerides: No  Do you use tobacco products?: No  Do you drink beer, wine or hard liquor?: No    Cultural Influences/Ethnic Background:  Not  or     LMP 10/18/2022 (Exact Date)       Estimated Date of Delivery: 2023    Blood Glucose/Ketone Log:     Date Ketones Fasting Post Breakfast Post Lunch Post Supper     89 113 121      106 -- 122 92     100 121 138 141     101 123 127 106     96 128 133 108     95 137 142 127     95 136 138 130       Lifestyle and Health Behaviors:  Pre-pregnancy weight (lbs): 130  Exercise:: Yes (walking, gardening, playing with kids)  Days per week of moderate to strenuous exercise (like a brisk walk): 7  On average, minutes per day of exercise at this level: 30  How intense was your typical exercise?: Light (like stretching or slow walking)  Exercise Minutes  "per Week: 210  Barrier to exercise: None  Cultural/Religion diet restrictions?: No  Meal planning/habits: Avoiding sweets, Smaller portions, Frequent snacking  How many times a week on average do you eat food made away from home (restaurant/take-out)?: 0  Meals include: Breakfast, Lunch, Dinner, Morning Snack, Afternoon Snack, Evening Snack  Breakfast: eggs and milk  Lunch: small amount of noodles, meat, vegetables and fruit  Dinner: meat, a little white rice, vegetables  Snacks: nuts, chips, crackers  Other: drinking a lot of water (1.5 liters/fday)  Beverages: Water, Milk  Biggest challenges to healthy eating: Portion control  Pre-amelia vitamin?: Yes  Supplements?: No  Experiencing nausea?: No  Experiencing heartburn?: Yes    Healthy Coping:  Emotional response to diabetes: Ready to learn, Concern for health and well-being  Informal Support system: Family  Stage of change: ACTION (Actively working towards change)    Current Management:  Taking medications for gestational diabetes?: Yes  Difficulty affording diabetes medication?: No (NPH free 1st fill)  Difficulty affording diabetes testing supplies?: No    ASSESSMENT:  Ketones: Not testing recently   Fasting blood glucoses: 43% in target. SEE BELOW  After breakfast: 100% in target.  After lunch: 86% in target.  After dinner: 83% in target.    Naa states \"I was using an insulin pen for longer than 42 days. I started a new insulin pen recently. My diet has not been as well controlled, I have been eating more noodles, fruit and bread.\" Naa states if she cuts back on noodles, she will be able to get enough to eat. \"If I eat less noodles, I will be eating more vegetables and protein. I like eating fruit and have been eating more watermelon.\" Naa states she typically will have fruit between meals. States she likes to eat a lot at one time, more than half of a small watermelon or 1 Lb of cherries, for example.     INTERVENTION:  Educational topics covered today:  " (Asked her to write down the BG targets and testing times for after delivery.)   What to expect after delivery, When to test BG after delivery and targets. Future testing for Type 2 diabetes (2 hour OGTT at 6 week post-partum check-up and annual fasting blood glucose level), Risk of GDM and planning ahead for future pregnancies, Recommended lifestyle interventions for reducing the risk of Type 2 Diabetes, When to Call a Diabetes Educator or OB Provider.      PLAN:  Check glucose 4 times daily.  Check ketones once a week when readings are consistently negative.  Continue with recommended physical activity.  Continue to follow GDM recommended meal plan. Enc to have fruit as a snack between meals and to combine with protein (she does like nuts), examples reviewed. Anticipate fasting BG to remain in target with starting a new insulin pen and decreasing carbohydrate that she has recently been eating more of.     Call/e-mail/Celirohart message diabetes educator if 3 or more blood sugars are above the goal in 1 week or if ketones are positive. Specifically reviewed calling in the next 3 to 4 days if 2 or more fasting BG are high. Gave her the diabetes triage phone number.     FREEDOM Wallace RDN, KENDALL    Time Spent: 45 minutes  Encounter Type: Individual    Any diabetes medication dose changes were made via the CDE Protocol and Collaborative Practice Agreement with the patient's referring provider. A copy of this encounter was shared with the provider.      FREEDOM Wallace RDN, KENDALL

## 2023-06-27 DIAGNOSIS — O09.93 SUPERVISION OF HIGH RISK PREGNANCY IN THIRD TRIMESTER: Primary | ICD-10-CM

## 2023-06-28 LAB — GP B STREP DNA SPEC QL NAA+PROBE: NEGATIVE

## 2023-06-30 ENCOUNTER — ANCILLARY PROCEDURE (OUTPATIENT)
Dept: ULTRASOUND IMAGING | Facility: CLINIC | Age: 38
End: 2023-06-30
Attending: STUDENT IN AN ORGANIZED HEALTH CARE EDUCATION/TRAINING PROGRAM
Payer: COMMERCIAL

## 2023-06-30 PROCEDURE — 76819 FETAL BIOPHYS PROFIL W/O NST: CPT | Performed by: OBSTETRICS & GYNECOLOGY

## 2023-07-03 ENCOUNTER — ANCILLARY PROCEDURE (OUTPATIENT)
Dept: ULTRASOUND IMAGING | Facility: CLINIC | Age: 38
End: 2023-07-03
Attending: STUDENT IN AN ORGANIZED HEALTH CARE EDUCATION/TRAINING PROGRAM
Payer: COMMERCIAL

## 2023-07-03 ENCOUNTER — VIRTUAL VISIT (OUTPATIENT)
Dept: EDUCATION SERVICES | Facility: CLINIC | Age: 38
End: 2023-07-03
Payer: COMMERCIAL

## 2023-07-03 ENCOUNTER — PRENATAL OFFICE VISIT (OUTPATIENT)
Dept: OBGYN | Facility: CLINIC | Age: 38
End: 2023-07-03
Payer: COMMERCIAL

## 2023-07-03 VITALS — WEIGHT: 161 LBS | DIASTOLIC BLOOD PRESSURE: 66 MMHG | BODY MASS INDEX: 26.18 KG/M2 | SYSTOLIC BLOOD PRESSURE: 116 MMHG

## 2023-07-03 DIAGNOSIS — O09.93 SUPERVISION OF HIGH RISK PREGNANCY IN THIRD TRIMESTER: ICD-10-CM

## 2023-07-03 DIAGNOSIS — O24.414 INSULIN CONTROLLED GESTATIONAL DIABETES MELLITUS (GDM) IN THIRD TRIMESTER: Primary | ICD-10-CM

## 2023-07-03 DIAGNOSIS — O34.219 HISTORY OF CESAREAN SECTION COMPLICATING PREGNANCY: ICD-10-CM

## 2023-07-03 DIAGNOSIS — O24.414 INSULIN CONTROLLED GESTATIONAL DIABETES MELLITUS (GDM) IN THIRD TRIMESTER: ICD-10-CM

## 2023-07-03 DIAGNOSIS — O09.93 SUPERVISION OF HIGH RISK PREGNANCY IN THIRD TRIMESTER: Primary | ICD-10-CM

## 2023-07-03 DIAGNOSIS — O09.523 AMA (ADVANCED MATERNAL AGE) MULTIGRAVIDA 35+, THIRD TRIMESTER: ICD-10-CM

## 2023-07-03 DIAGNOSIS — O35.EXX0 PYELECTASIS OF FETUS ON PRENATAL ULTRASOUND: ICD-10-CM

## 2023-07-03 PROCEDURE — 99207 PR PRENATAL VISIT: CPT | Performed by: OBSTETRICS & GYNECOLOGY

## 2023-07-03 PROCEDURE — 76816 OB US FOLLOW-UP PER FETUS: CPT | Performed by: OBSTETRICS & GYNECOLOGY

## 2023-07-03 PROCEDURE — 76819 FETAL BIOPHYS PROFIL W/O NST: CPT | Performed by: OBSTETRICS & GYNECOLOGY

## 2023-07-03 PROCEDURE — 98967 PH1 ASSMT&MGMT NQHP 11-20: CPT | Mod: 95

## 2023-07-03 NOTE — LETTER
7/3/2023         RE: Naa Yap  [id number]  [place] [address]        Dear Colleague,    Thank you for referring your patient, Naa Yap, to the [facility]. Please see a copy of my visit note below.    Diabetes Self-Management Education & Support  Type of Service: Telephone Visit    Originating Location (Patient Location): Home  Distant Location (Provider Location): Offsite  Mode of Communication:  Telephone    Telephone Visit Start Time: 245 PM  Telephone Visit End Time (telephone visit stop time): 300 PM    How would patient like to obtain AVS? MyChart    SUBJECTIVE/OBJECTIVE:  Presents for education related to gestational diabetes.    Accompanied by: , Self  Diabetes management related comments/concerns: questions about which fruit she can eat  Gestational weeks: 36w6d  Hospital planned for delivery: [facility] OB Visit Date: 23  Number of previous pregnancies: 1 ( (1 living))  Previously had Gestational Diabetes: No  Have you ever had thyroid problems or taken thyroid medication?: No  Heart disease, mitral valve prolapse or rheumatic fever?: No  Hypertension : No  High Cholesterol: No  High Triglycerides: No  Do you use tobacco products?: No  Do you drink beer, wine or hard liquor?: No    Cultural Influences/Ethnic Background:  Not  or     LMP 10/18/2022 (Exact Date)       Estimated Date of Delivery: 2023    Blood Glucose/Ketone Log:    Date Ketones Fasting Post Breakfast Post Lunch Post Supper   7/3  82 115 127      91 128 125 107   7/182  89 172- bread 113 120     86 128 131 107- 2 hr     98 169- bread 140 116- 2 hr     85 127 130 107     87 124 138 114       Lifestyle and Health Behaviors:  Pre-pregnancy weight (lbs): 130  Exercise:: Yes (walking, gardening, playing with kids)  Days per week of moderate to strenuous exercise (like a brisk walk): 7  On average, minutes per day of exercise at this  level: 30  How intense was your typical exercise? : Light (like stretching or slow walking)  Exercise Minutes per Week: 210  Barrier to exercise: None  Cultural/Scientologist diet restrictions?: No  Meal planning/habits: Avoiding sweets, Smaller portions, Frequent snacking  How many times a week on average do you eat food made away from home (restaurant/take-out)?: 0  Meals include: Breakfast, Lunch, Dinner, Morning Snack, Afternoon Snack, Evening Snack  Breakfast: eggs and milk  Lunch: small amount of noodles, meat, vegetables and fruit  Dinner: meat, a little white rice, vegetables  Snacks: nuts, chips, crackers  Other: drinking a lot of water (1.5 liters/fday)  Beverages: Water, Milk  Biggest challenges to healthy eating: Portion control  Pre-amelia vitamin?: Yes  Supplements?: No  Experiencing nausea?: No  Experiencing heartburn?: Yes    Healthy Coping:  Emotional response to diabetes: Ready to learn, Concern for health and well-being  Informal Support system:: Family  Stage of change: ACTION (Actively working towards change)    Current Management:  Taking medications for gestational diabetes?: Yes  Difficulty affording diabetes medication?: No (NPH free 1st fill)  Difficulty affording diabetes testing supplies?: No    ASSESSMENT:  Ketones: Not testing.   Fasting blood glucoses: 100 % in target.  After breakfast: 100 % in target.  After lunch: 71 % in target.  After dinner: 100% in target.    Most all BG in target. She is aware why her BG are high after lunch as she is typically eating more carbohydrate when that happens.   Reviewed labor and delivery concepts as well as BG testing after delivery as discussed at her last visit and she has no questions at this time.     INTERVENTION:  Educational topics covered today:  What to expect after delivery, Future testing for Type 2 diabetes (2 hour OGTT at 6 week post-partum check-up and annual fasting blood glucose level), Risk of GDM and planning ahead for future  pregnancies, Recommended lifestyle interventions for reducing the risk of Type 2 Diabetes, When to Call a Diabetes Educator or OB Provider    PLAN:  Check glucose 4 times daily.  Continue with recommended physical activity.  Continue to follow GDM recommended meal plan  Follow consistent CHO meal plan, eat CHO and protein/fat at all meals/snacks.    Call/e-mail/MyChart message diabetes educator if 3 or more blood sugars are above the goal in 1 week or if ketones are positive.    Anastacia Jonas RDN, FREEDOM, [facility]    Time Spent: 15 minutes  Encounter Type: Individual    Any diabetes medication dose changes were made via the CDE Protocol and Collaborative Practice Agreement with the patient's referring provider. A copy of this encounter was shared with the provider.

## 2023-07-03 NOTE — PROGRESS NOTES
OB Visit @ 36w6d     Ipad  used    No loss of fluid/vaginal bleeding/regular contractions. + FM    Last check in with DNE 6/26/23. States her morning BS's have been better, less than 90.     Has been referred to Pediatric Urology. Was told to call when baby was delivered.       Results for orders placed or performed in visit on 07/03/23   US OB Biophys Single Gestation Measure    Narrative    Table formatting from the original result was not included.   OB Biophys Single Gestation Measure  Order #: [id number] Accession #: [id number]      Terri Macario on 7/3/2023 10:47 AM      Obstetrical Ultrasound Report  OB U/S Follow Up > 14 Weeks and BPP- Transabdominal  [facility]  Referring physician: Dr. Carrion Masters  Sonographer: Terri Macario RDMS  Indication:  BPP (including NILDA) and F/U Growth     Dating (mm/dd/yyyy):   LMP: Patient's last menstrual period was 10/18/2022 (exact date).        EDC:  Estimated Date of Delivery: Jul 25, 2023   GA by LMP:                36w6d  Current Scan On (mm/dd/yyyy):  7/3/2023                          EDC:   07/24/23             GA by   Current Scan:      37w0d  The calculation of the gestational age by current scan was based on BPD,   HC, AC and FL.     Anatomy Scan:  Frey gestation.  Visualized: Stomach, Kidneys, and Bladder. Left kidney dilated 11mm   Biometry:  BPD 9.31 cm 37w6d 86.6%   HC 34.16 cm 39w3d 81.6%   AC 33.42 cm 37w2d 75.8%   FL  HL 6.53 cm  5.75 cm 33w5d  33w3d <2.3%  <5.0%   EFW (lbs/oz) 6 lbs               10ozs       EFW (g) 3012 g 51.5%        Fetal heart rate: 144 bpm  Fetal presentation: Cephalic  Amniotic fluid: 3.42cm MVP  Placenta: Anterior, no previa, > 2 cm from internal os  Maternal Anatomy:  Right adnexa: wnl  Left adnexa: wnl  Biophysical Profile:  Fetal body movements: Normal (2)  Fetal tone: Normal (2)  Fetal breathing movements: Normal (2)  Amniotic fluid volume: Normal (2)   BPP Score: 8/8     Impression:    Cephalic fetus.  Normal amniotic fluid.  Anterior placenta no previa.    Reassuring biophysical profile 8 out of 8.  Normal fetal growth estimated   fetal weight 6 pounds 10 ounces which is 51st percentile.    Sheyla Alejos, DO                    ICD-10-CM    1. Supervision of high risk pregnancy in third trimester  O09.93       2. Insulin controlled gestational diabetes mellitus (GDM) in third trimester  O24.414       3. History of  section complicating pregnancy  O34.219       4. Pyelectasis of fetus on prenatal ultrasound  O35.EXX0       5. AMA (advanced maternal age) multigravida 35+, third trimester  O09.523           -Fetal pyelectasis. 11mm.   Has been prev referred to Peds Uro. Rec she call and make appt since she knows when baby's delivery date is.   -Hx C/S, planned repeat 23  -GDMA2. On insulin. Working on control. Follows with DNE. Discussed importance of control.  Reviewed reassuring BPP with patient today.   Reviewed with patient normal growth US  - Labor precautions. F/U 1wk    Sheyla Alejos, DO

## 2023-07-03 NOTE — PROGRESS NOTES
Diabetes Self-Management Education & Support  Type of Service: Telephone Visit    Originating Location (Patient Location): Home  Distant Location (Provider Location): Offsite  Mode of Communication:  Telephone    Telephone Visit Start Time: 245 PM  Telephone Visit End Time (telephone visit stop time): 300 PM    How would patient like to obtain AVS? Sherri    SUBJECTIVE/OBJECTIVE:  Presents for education related to gestational diabetes.    Accompanied by: , Self  Diabetes management related comments/concerns: questions about which fruit she can eat  Gestational weeks: 36w6d  [facility]ospital planned [facility] delivery: [facility]  Next OB Visit Date: 23  Number of previous pregnancies: 1 ( (1 living))  Previously had Gestational Diabetes: No  Have you ever had thyroid problems or taken thyroid medication?: No  Heart disease, mitral valve prolapse or rheumatic fever?: No  Hypertension : No  High Cholesterol: No  High Triglycerides: No  Do you use tobacco products?: No  Do you drink beer, wine or hard liquor?: No    Cultural Influences/Ethnic Background:  Not  or     LMP 10/18/2022 (Exact Date)       Estimated Date of Delivery: 2023    Blood Glucose/Ketone Log:    Date Ketones Fasting Post Breakfast Post Lunch Post Supper   7/3  82 115 127    7/  91 128 125 107   /  89 172- bread 113 120     86 128 131 107- 2 hr     98 169- bread 140 116- 2 hr     85 127 130 107     87 124 138 114       Lifestyle and Health Behaviors:  Pre-pregnancy weight (lbs): 130  Exercise:: Yes (walking, gardening, playing with kids)  Days per week of moderate to strenuous exercise (like a brisk walk): 7  On average, minutes per day of exercise at this level: 30  How intense was your typical exercise? : Light (like stretching or slow walking)  Exercise Minutes per Week: 210  Barrier to exercise: None  Cultural/Sikh diet restrictions?: No  Meal planning/habits: Avoiding sweets,  Smaller portions, Frequent snacking  How many times a week on average do you eat food made away from home (restaurant/take-out)?: 0  Meals include: Breakfast, Lunch, Dinner, Morning Snack, Afternoon Snack, Evening Snack  Breakfast: eggs and milk  Lunch: small amount of noodles, meat, vegetables and fruit  Dinner: meat, a little white rice, vegetables  Snacks: nuts, chips, crackers  Other: drinking a lot of water (1.5 liters/fday)  Beverages: Water, Milk  Biggest challenges to healthy eating: Portion control  Pre- vitamin?: Yes  Supplements?: No  Experiencing nausea?: No  Experiencing heartburn?: Yes    Healthy Coping:  Emotional response to diabetes: Ready to learn, Concern for health and well-being  Informal Support system:: Family  Stage of change: ACTION (Actively working towards change)    Current Management:  Taking medications for gestational diabetes?: Yes  Difficulty affording diabetes medication?: No (NPH free 1st fill)  Difficulty affording diabetes testing supplies?: No    ASSESSMENT:  Ketones: Not testing.   Fasting blood glucoses: 100 % in target.  After breakfast: 100 % in target.  After lunch: 71 % in target.  After dinner: 100% in target.    Most all BG in target. She is aware why her BG are high after lunch as she is typically eating more carbohydrate when that happens.   Reviewed labor and delivery concepts as well as BG testing after delivery as discussed at her last visit and she has no questions at this time.     INTERVENTION:  Educational topics covered today:  What to expect after delivery, Future testing for Type 2 diabetes (2 hour OGTT at 6 week post-partum check-up and annual fasting blood glucose level), Risk of GDM and planning ahead for future pregnancies, Recommended lifestyle interventions for reducing the risk of Type 2 Diabetes, When to Call a Diabetes Educator or OB Provider    PLAN:  Check glucose 4 times daily.  Continue with recommended physical activity.  Continue to follow  GDM recommended meal plan  Follow consistent CHO meal plan, eat CHO and protein/fat at all meals/snacks.    Call/e-mail/MyChart message diabetes educator if 3 or more blood sugars are above the goal in 1 week or if ketones are positive.    Anastacia Jonas RDN, FREEDOM, [facility]    Time Spent: 15 minutes  Encounter Type: Individual    Any diabetes medication dose changes were made via the CDE Protocol and Collaborative Practice Agreement with the patient's referring provider. A copy of this encounter was shared with the provider.

## 2023-07-07 ENCOUNTER — ANCILLARY PROCEDURE (OUTPATIENT)
Dept: ULTRASOUND IMAGING | Facility: CLINIC | Age: 38
End: 2023-07-07
Attending: STUDENT IN AN ORGANIZED HEALTH CARE EDUCATION/TRAINING PROGRAM
Payer: COMMERCIAL

## 2023-07-07 PROCEDURE — 76819 FETAL BIOPHYS PROFIL W/O NST: CPT | Performed by: OBSTETRICS & GYNECOLOGY

## 2023-07-10 ENCOUNTER — PRENATAL OFFICE VISIT (OUTPATIENT)
Dept: OBGYN | Facility: CLINIC | Age: 38
End: 2023-07-10
Payer: COMMERCIAL

## 2023-07-10 ENCOUNTER — ANCILLARY PROCEDURE (OUTPATIENT)
Dept: ULTRASOUND IMAGING | Facility: CLINIC | Age: 38
End: 2023-07-10
Attending: STUDENT IN AN ORGANIZED HEALTH CARE EDUCATION/TRAINING PROGRAM
Payer: COMMERCIAL

## 2023-07-10 VITALS — WEIGHT: 160 LBS | SYSTOLIC BLOOD PRESSURE: 112 MMHG | DIASTOLIC BLOOD PRESSURE: 70 MMHG | BODY MASS INDEX: 26.02 KG/M2

## 2023-07-10 DIAGNOSIS — O35.EXX0 PYELECTASIS OF FETUS ON PRENATAL ULTRASOUND: ICD-10-CM

## 2023-07-10 DIAGNOSIS — O34.219 HISTORY OF CESAREAN SECTION COMPLICATING PREGNANCY: ICD-10-CM

## 2023-07-10 DIAGNOSIS — O24.414 INSULIN CONTROLLED GESTATIONAL DIABETES MELLITUS (GDM) IN THIRD TRIMESTER: ICD-10-CM

## 2023-07-10 DIAGNOSIS — O09.523 AMA (ADVANCED MATERNAL AGE) MULTIGRAVIDA 35+, THIRD TRIMESTER: ICD-10-CM

## 2023-07-10 DIAGNOSIS — O09.93 SUPERVISION OF HIGH RISK PREGNANCY IN THIRD TRIMESTER: Primary | ICD-10-CM

## 2023-07-10 PROCEDURE — 76819 FETAL BIOPHYS PROFIL W/O NST: CPT

## 2023-07-10 PROCEDURE — 99207 PR PRENATAL VISIT: CPT | Performed by: STUDENT IN AN ORGANIZED HEALTH CARE EDUCATION/TRAINING PROGRAM

## 2023-07-10 NOTE — PROGRESS NOTES
Prenatal Care    Seen with phone Mandarin .     Naa Yap is a 37 year old  at 37w6d by LMP c/w 6w5d US presenting for routine prenatal care. Denies LOF, VB, ctx. +FM. Had a fall last week playing with child, onto bottom. Doing okay.   GDMA2: On insulin 8u NPH at bedtime. Has some higher values.  post breakfast was 170 and  177 post breakfast. Had one 211 reading after lunch . Had eaten some fruits prior. Otherwise values usually are normal. >50% are wnl.     Conditions affecting pregnancy:  - AMA  - hx of CS x 1   - Hx of preE   - GDMA2  - Fetal renal pyelectasis     Objective- see flow sheet    Naa Yap is a 37 year old  at 37w6d presenting for routine ob visit.       ICD-10-CM    1. Supervision of high risk pregnancy in third trimester  O09.93       2. Insulin controlled gestational diabetes mellitus (GDM) in third trimester  O24.414       3. History of  section complicating pregnancy  O34.219       4. Pyelectasis of fetus on prenatal ultrasound  O35.EXX0       5. AMA (advanced maternal age) multigravida 35+, third trimester  O09.523         - Hx of preE   - ASA- rx provided. Refill provided.   - Baseline labs wnl. Repeat as clinically indicated.    - BP normal today, no signs/symptoms.     - GDMA2   - On insulin. Refill for strips and needles provided.    - Twice weekly BPPs scheduled,  today normal MVP, final report pending.    - Growth US 33w: EFW 39%ile, Repeat at 36w6d: EFW 51%ile    - Delivery timing: RCS 39w2d    - Fetal pyelectasis   - S/p peds uro, plan post-amelia follow-up.     Routine care  - First tri labs wnl. NIPT was not covered by insurance, requesting letter for coverage, sent.  AMA- s/p level II: fetal pyelectasis. S/p COVID-19 vaccine-received three doses in [place], last in 2022. S/p Flu vax, TDAP. Third tri labs: Failed GCT, GTT. GBS negative.   - Discussed routine precautions.  - Hx of CS x 1: repeat scheduled  11am-  scheduled at this time specifically for cultural reasons. ERAS bag next visit.   - TWlbs. Pregravid BMI 21. Expect 25-35lbs.    Follow-up weekly for routine care.    Anna Marie June MD, MHS  07/10/23

## 2023-07-11 ENCOUNTER — ANESTHESIA EVENT (OUTPATIENT)
Dept: OBGYN | Facility: CLINIC | Age: 38
End: 2023-07-11
Payer: COMMERCIAL

## 2023-07-11 ENCOUNTER — NURSE TRIAGE (OUTPATIENT)
Dept: NURSING | Facility: CLINIC | Age: 38
End: 2023-07-11
Payer: COMMERCIAL

## 2023-07-11 ENCOUNTER — HOSPITAL ENCOUNTER (INPATIENT)
Facility: CLINIC | Age: 38
LOS: 3 days | Discharge: HOME HEALTH | End: 2023-07-14
Attending: OBSTETRICS & GYNECOLOGY | Admitting: OBSTETRICS & GYNECOLOGY
Payer: COMMERCIAL

## 2023-07-11 DIAGNOSIS — Z98.891 S/P REPEAT LOW TRANSVERSE C-SECTION: ICD-10-CM

## 2023-07-11 PROBLEM — Z36.89 ENCOUNTER FOR TRIAGE IN PREGNANT PATIENT: Status: ACTIVE | Noted: 2023-07-11

## 2023-07-11 LAB
ABO/RH(D): NORMAL
ALBUMIN MFR UR ELPH: 4.7 MG/DL
ALT SERPL W P-5'-P-CCNC: 8 U/L (ref 0–50)
ANTIBODY SCREEN: NEGATIVE
APTT PPP: 25 SECONDS (ref 22–38)
AST SERPL W P-5'-P-CCNC: 14 U/L (ref 0–45)
CREAT SERPL-MCNC: 0.77 MG/DL (ref 0.51–0.95)
CREAT UR-MCNC: 27.6 MG/DL
ERYTHROCYTE [DISTWIDTH] IN BLOOD BY AUTOMATED COUNT: 13.2 % (ref 10–15)
GFR SERPL CREATININE-BSD FRML MDRD: >90 ML/MIN/1.73M2
HCT VFR BLD AUTO: 41 % (ref 35–47)
HGB BLD-MCNC: 13.7 G/DL (ref 11.7–15.7)
HOLD SPECIMEN: NORMAL
INR PPP: 0.91 (ref 0.85–1.15)
MCH RBC QN AUTO: 29.5 PG (ref 26.5–33)
MCHC RBC AUTO-ENTMCNC: 33.4 G/DL (ref 31.5–36.5)
MCV RBC AUTO: 88 FL (ref 78–100)
PLATELET # BLD AUTO: 157 10E3/UL (ref 150–450)
PROT/CREAT 24H UR: 0.17 MG/MG CR (ref 0–0.2)
RBC # BLD AUTO: 4.64 10E6/UL (ref 3.8–5.2)
SPECIMEN EXPIRATION DATE: NORMAL
WBC # BLD AUTO: 9.4 10E3/UL (ref 4–11)

## 2023-07-11 PROCEDURE — 250N000009 HC RX 250: Performed by: NURSE ANESTHETIST, CERTIFIED REGISTERED

## 2023-07-11 PROCEDURE — 85730 THROMBOPLASTIN TIME PARTIAL: CPT | Performed by: OBSTETRICS & GYNECOLOGY

## 2023-07-11 PROCEDURE — 370N000017 HC ANESTHESIA TECHNICAL FEE, PER MIN: Performed by: OBSTETRICS & GYNECOLOGY

## 2023-07-11 PROCEDURE — 36415 COLL VENOUS BLD VENIPUNCTURE: CPT | Performed by: OBSTETRICS & GYNECOLOGY

## 2023-07-11 PROCEDURE — 59510 CESAREAN DELIVERY: CPT | Performed by: OBSTETRICS & GYNECOLOGY

## 2023-07-11 PROCEDURE — 250N000011 HC RX IP 250 OP 636: Mod: JZ | Performed by: OBSTETRICS & GYNECOLOGY

## 2023-07-11 PROCEDURE — 84450 TRANSFERASE (AST) (SGOT): CPT | Performed by: OBSTETRICS & GYNECOLOGY

## 2023-07-11 PROCEDURE — 82565 ASSAY OF CREATININE: CPT | Performed by: OBSTETRICS & GYNECOLOGY

## 2023-07-11 PROCEDURE — 84460 ALANINE AMINO (ALT) (SGPT): CPT | Performed by: OBSTETRICS & GYNECOLOGY

## 2023-07-11 PROCEDURE — 258N000003 HC RX IP 258 OP 636: Performed by: OBSTETRICS & GYNECOLOGY

## 2023-07-11 PROCEDURE — 120N000001 HC R&B MED SURG/OB

## 2023-07-11 PROCEDURE — 250N000013 HC RX MED GY IP 250 OP 250 PS 637

## 2023-07-11 PROCEDURE — 85610 PROTHROMBIN TIME: CPT | Performed by: OBSTETRICS & GYNECOLOGY

## 2023-07-11 PROCEDURE — 86780 TREPONEMA PALLIDUM: CPT | Performed by: OBSTETRICS & GYNECOLOGY

## 2023-07-11 PROCEDURE — 86901 BLOOD TYPING SEROLOGIC RH(D): CPT | Performed by: OBSTETRICS & GYNECOLOGY

## 2023-07-11 PROCEDURE — G0463 HOSPITAL OUTPT CLINIC VISIT: HCPCS

## 2023-07-11 PROCEDURE — 85027 COMPLETE CBC AUTOMATED: CPT | Performed by: OBSTETRICS & GYNECOLOGY

## 2023-07-11 PROCEDURE — 360N000076 HC SURGERY LEVEL 3, PER MIN: Performed by: OBSTETRICS & GYNECOLOGY

## 2023-07-11 PROCEDURE — 84156 ASSAY OF PROTEIN URINE: CPT | Performed by: OBSTETRICS & GYNECOLOGY

## 2023-07-11 PROCEDURE — 250N000011 HC RX IP 250 OP 636

## 2023-07-11 PROCEDURE — 710N000009 HC RECOVERY PHASE 1, LEVEL 1, PER MIN: Performed by: OBSTETRICS & GYNECOLOGY

## 2023-07-11 PROCEDURE — 86850 RBC ANTIBODY SCREEN: CPT | Performed by: OBSTETRICS & GYNECOLOGY

## 2023-07-11 PROCEDURE — 250N000011 HC RX IP 250 OP 636: Performed by: NURSE ANESTHETIST, CERTIFIED REGISTERED

## 2023-07-11 PROCEDURE — 272N000001 HC OR GENERAL SUPPLY STERILE: Performed by: OBSTETRICS & GYNECOLOGY

## 2023-07-11 RX ORDER — SODIUM CHLORIDE, SODIUM LACTATE, POTASSIUM CHLORIDE, CALCIUM CHLORIDE 600; 310; 30; 20 MG/100ML; MG/100ML; MG/100ML; MG/100ML
INJECTION, SOLUTION INTRAVENOUS CONTINUOUS
Status: DISCONTINUED | OUTPATIENT
Start: 2023-07-11 | End: 2023-07-12 | Stop reason: HOSPADM

## 2023-07-11 RX ORDER — OXYTOCIN/0.9 % SODIUM CHLORIDE 30/500 ML
340 PLASTIC BAG, INJECTION (ML) INTRAVENOUS CONTINUOUS PRN
Status: DISCONTINUED | OUTPATIENT
Start: 2023-07-11 | End: 2023-07-12 | Stop reason: HOSPADM

## 2023-07-11 RX ORDER — CITRIC ACID/SODIUM CITRATE 334-500MG
30 SOLUTION, ORAL ORAL
Status: COMPLETED | OUTPATIENT
Start: 2023-07-11 | End: 2023-07-11

## 2023-07-11 RX ORDER — NALOXONE HYDROCHLORIDE 0.4 MG/ML
0.4 INJECTION, SOLUTION INTRAMUSCULAR; INTRAVENOUS; SUBCUTANEOUS
Status: DISCONTINUED | OUTPATIENT
Start: 2023-07-11 | End: 2023-07-14 | Stop reason: HOSPADM

## 2023-07-11 RX ORDER — CEFAZOLIN SODIUM 2 G/100ML
2 INJECTION, SOLUTION INTRAVENOUS SEE ADMIN INSTRUCTIONS
Status: DISCONTINUED | OUTPATIENT
Start: 2023-07-11 | End: 2023-07-12 | Stop reason: HOSPADM

## 2023-07-11 RX ORDER — METHYLERGONOVINE MALEATE 0.2 MG/ML
200 INJECTION INTRAVENOUS
Status: DISCONTINUED | OUTPATIENT
Start: 2023-07-11 | End: 2023-07-12 | Stop reason: HOSPADM

## 2023-07-11 RX ORDER — ONDANSETRON 2 MG/ML
4 INJECTION INTRAMUSCULAR; INTRAVENOUS EVERY 4 HOURS PRN
Status: DISCONTINUED | OUTPATIENT
Start: 2023-07-11 | End: 2023-07-12

## 2023-07-11 RX ORDER — ACETAMINOPHEN 325 MG/1
975 TABLET ORAL ONCE
Status: DISCONTINUED | OUTPATIENT
Start: 2023-07-11 | End: 2023-07-11

## 2023-07-11 RX ORDER — METOCLOPRAMIDE HYDROCHLORIDE 5 MG/ML
10 INJECTION INTRAMUSCULAR; INTRAVENOUS EVERY 6 HOURS PRN
Status: DISCONTINUED | OUTPATIENT
Start: 2023-07-11 | End: 2023-07-12 | Stop reason: HOSPADM

## 2023-07-11 RX ORDER — CARBOPROST TROMETHAMINE 250 UG/ML
250 INJECTION, SOLUTION INTRAMUSCULAR
Status: DISCONTINUED | OUTPATIENT
Start: 2023-07-11 | End: 2023-07-12 | Stop reason: HOSPADM

## 2023-07-11 RX ORDER — ACETAMINOPHEN 325 MG/1
TABLET ORAL
Status: COMPLETED
Start: 2023-07-11 | End: 2023-07-11

## 2023-07-11 RX ORDER — TRANEXAMIC ACID 10 MG/ML
1 INJECTION, SOLUTION INTRAVENOUS EVERY 30 MIN PRN
Status: DISCONTINUED | OUTPATIENT
Start: 2023-07-11 | End: 2023-07-12 | Stop reason: HOSPADM

## 2023-07-11 RX ORDER — NALOXONE HYDROCHLORIDE 0.4 MG/ML
0.2 INJECTION, SOLUTION INTRAMUSCULAR; INTRAVENOUS; SUBCUTANEOUS
Status: DISCONTINUED | OUTPATIENT
Start: 2023-07-11 | End: 2023-07-14 | Stop reason: HOSPADM

## 2023-07-11 RX ORDER — ONDANSETRON 2 MG/ML
INJECTION INTRAMUSCULAR; INTRAVENOUS PRN
Status: DISCONTINUED | OUTPATIENT
Start: 2023-07-11 | End: 2023-07-11

## 2023-07-11 RX ORDER — KETOROLAC TROMETHAMINE 30 MG/ML
INJECTION, SOLUTION INTRAMUSCULAR; INTRAVENOUS PRN
Status: DISCONTINUED | OUTPATIENT
Start: 2023-07-11 | End: 2023-07-11

## 2023-07-11 RX ORDER — OXYTOCIN/0.9 % SODIUM CHLORIDE 30/500 ML
PLASTIC BAG, INJECTION (ML) INTRAVENOUS CONTINUOUS PRN
Status: DISCONTINUED | OUTPATIENT
Start: 2023-07-11 | End: 2023-07-11

## 2023-07-11 RX ORDER — MISOPROSTOL 200 UG/1
800 TABLET ORAL
Status: DISCONTINUED | OUTPATIENT
Start: 2023-07-11 | End: 2023-07-12 | Stop reason: HOSPADM

## 2023-07-11 RX ORDER — CEFAZOLIN SODIUM 2 G/100ML
2 INJECTION, SOLUTION INTRAVENOUS
Status: COMPLETED | OUTPATIENT
Start: 2023-07-11 | End: 2023-07-11

## 2023-07-11 RX ORDER — ONDANSETRON 4 MG/1
4 TABLET, ORALLY DISINTEGRATING ORAL EVERY 6 HOURS PRN
Status: DISCONTINUED | OUTPATIENT
Start: 2023-07-11 | End: 2023-07-12 | Stop reason: HOSPADM

## 2023-07-11 RX ORDER — BUPIVACAINE HYDROCHLORIDE 7.5 MG/ML
INJECTION, SOLUTION INTRASPINAL PRN
Status: DISCONTINUED | OUTPATIENT
Start: 2023-07-11 | End: 2023-07-11

## 2023-07-11 RX ORDER — MORPHINE SULFATE 1 MG/ML
INJECTION, SOLUTION EPIDURAL; INTRATHECAL; INTRAVENOUS PRN
Status: DISCONTINUED | OUTPATIENT
Start: 2023-07-11 | End: 2023-07-11

## 2023-07-11 RX ORDER — PROCHLORPERAZINE 25 MG
25 SUPPOSITORY, RECTAL RECTAL EVERY 12 HOURS PRN
Status: DISCONTINUED | OUTPATIENT
Start: 2023-07-11 | End: 2023-07-12 | Stop reason: HOSPADM

## 2023-07-11 RX ORDER — DIPHENHYDRAMINE HYDROCHLORIDE 50 MG/ML
12.5 INJECTION INTRAMUSCULAR; INTRAVENOUS EVERY 6 HOURS PRN
Status: CANCELLED | OUTPATIENT
Start: 2023-07-11

## 2023-07-11 RX ORDER — ONDANSETRON 2 MG/ML
4 INJECTION INTRAMUSCULAR; INTRAVENOUS EVERY 6 HOURS PRN
Status: DISCONTINUED | OUTPATIENT
Start: 2023-07-11 | End: 2023-07-12 | Stop reason: HOSPADM

## 2023-07-11 RX ORDER — CEFAZOLIN SODIUM/WATER 2 G/20 ML
SYRINGE (ML) INTRAVENOUS
Status: DISCONTINUED
Start: 2023-07-11 | End: 2023-07-11 | Stop reason: HOSPADM

## 2023-07-11 RX ORDER — LIDOCAINE 40 MG/G
CREAM TOPICAL
Status: DISCONTINUED | OUTPATIENT
Start: 2023-07-11 | End: 2023-07-12 | Stop reason: HOSPADM

## 2023-07-11 RX ORDER — CITRIC ACID/SODIUM CITRATE 334-500MG
SOLUTION, ORAL ORAL
Status: COMPLETED
Start: 2023-07-11 | End: 2023-07-11

## 2023-07-11 RX ORDER — MISOPROSTOL 200 UG/1
400 TABLET ORAL
Status: DISCONTINUED | OUTPATIENT
Start: 2023-07-11 | End: 2023-07-12 | Stop reason: HOSPADM

## 2023-07-11 RX ORDER — NICOTINE POLACRILEX 4 MG
15-30 LOZENGE BUCCAL
Status: DISCONTINUED | OUTPATIENT
Start: 2023-07-11 | End: 2023-07-12

## 2023-07-11 RX ORDER — FENTANYL CITRATE 50 UG/ML
INJECTION, SOLUTION INTRAMUSCULAR; INTRAVENOUS PRN
Status: DISCONTINUED | OUTPATIENT
Start: 2023-07-11 | End: 2023-07-11

## 2023-07-11 RX ORDER — DEXTROSE MONOHYDRATE 25 G/50ML
25-50 INJECTION, SOLUTION INTRAVENOUS
Status: DISCONTINUED | OUTPATIENT
Start: 2023-07-11 | End: 2023-07-12

## 2023-07-11 RX ORDER — METOCLOPRAMIDE 10 MG/1
10 TABLET ORAL EVERY 6 HOURS PRN
Status: DISCONTINUED | OUTPATIENT
Start: 2023-07-11 | End: 2023-07-12 | Stop reason: HOSPADM

## 2023-07-11 RX ORDER — AZITHROMYCIN 500 MG/1
500 INJECTION, POWDER, LYOPHILIZED, FOR SOLUTION INTRAVENOUS
Status: COMPLETED | OUTPATIENT
Start: 2023-07-11 | End: 2023-07-11

## 2023-07-11 RX ORDER — OXYTOCIN 10 [USP'U]/ML
10 INJECTION, SOLUTION INTRAMUSCULAR; INTRAVENOUS
Status: DISCONTINUED | OUTPATIENT
Start: 2023-07-11 | End: 2023-07-12 | Stop reason: HOSPADM

## 2023-07-11 RX ORDER — AZITHROMYCIN 500 MG/1
INJECTION, POWDER, LYOPHILIZED, FOR SOLUTION INTRAVENOUS
Status: DISCONTINUED
Start: 2023-07-11 | End: 2023-07-11 | Stop reason: HOSPADM

## 2023-07-11 RX ORDER — ACETAMINOPHEN 325 MG/1
975 TABLET ORAL ONCE
Status: COMPLETED | OUTPATIENT
Start: 2023-07-11 | End: 2023-07-11

## 2023-07-11 RX ORDER — PROCHLORPERAZINE MALEATE 5 MG
10 TABLET ORAL EVERY 6 HOURS PRN
Status: DISCONTINUED | OUTPATIENT
Start: 2023-07-11 | End: 2023-07-12 | Stop reason: HOSPADM

## 2023-07-11 RX ORDER — DIPHENHYDRAMINE HCL 25 MG
25 CAPSULE ORAL EVERY 6 HOURS PRN
Status: CANCELLED | OUTPATIENT
Start: 2023-07-11

## 2023-07-11 RX ADMIN — BUPIVACAINE HYDROCHLORIDE IN DEXTROSE 10.5 MG: 7.5 INJECTION, SOLUTION SUBARACHNOID at 22:48

## 2023-07-11 RX ADMIN — ACETAMINOPHEN 975 MG: 325 TABLET, FILM COATED ORAL at 20:54

## 2023-07-11 RX ADMIN — CEFAZOLIN SODIUM 2 G: 2 INJECTION, SOLUTION INTRAVENOUS at 22:40

## 2023-07-11 RX ADMIN — Medication 30 ML: at 22:32

## 2023-07-11 RX ADMIN — FENTANYL CITRATE 15 MCG: 50 INJECTION, SOLUTION INTRAMUSCULAR; INTRAVENOUS at 22:48

## 2023-07-11 RX ADMIN — SODIUM CHLORIDE, POTASSIUM CHLORIDE, SODIUM LACTATE AND CALCIUM CHLORIDE: 600; 310; 30; 20 INJECTION, SOLUTION INTRAVENOUS at 21:43

## 2023-07-11 RX ADMIN — MORPHINE SULFATE 100 MCG: 1 INJECTION, SOLUTION EPIDURAL; INTRATHECAL; INTRAVENOUS at 22:48

## 2023-07-11 RX ADMIN — KETOROLAC TROMETHAMINE 30 MG: 30 INJECTION, SOLUTION INTRAMUSCULAR at 23:40

## 2023-07-11 RX ADMIN — AZITHROMYCIN 500 MG: 500 INJECTION, POWDER, LYOPHILIZED, FOR SOLUTION INTRAVENOUS at 22:32

## 2023-07-11 RX ADMIN — ONDANSETRON 4 MG: 2 INJECTION INTRAMUSCULAR; INTRAVENOUS at 22:40

## 2023-07-11 RX ADMIN — ACETAMINOPHEN 975 MG: 325 TABLET ORAL at 20:54

## 2023-07-11 RX ADMIN — SODIUM CITRATE AND CITRIC ACID MONOHYDRATE 30 ML: 500; 334 SOLUTION ORAL at 22:32

## 2023-07-11 RX ADMIN — SODIUM CHLORIDE, POTASSIUM CHLORIDE, SODIUM LACTATE AND CALCIUM CHLORIDE 500 ML: 600; 310; 30; 20 INJECTION, SOLUTION INTRAVENOUS at 21:06

## 2023-07-11 RX ADMIN — Medication 340 ML/HR: at 23:08

## 2023-07-11 RX ADMIN — AZITHROMYCIN MONOHYDRATE 500 MG: 500 INJECTION, POWDER, LYOPHILIZED, FOR SOLUTION INTRAVENOUS at 22:32

## 2023-07-11 ASSESSMENT — ACTIVITIES OF DAILY LIVING (ADL)
ADLS_ACUITY_SCORE: 31
ADLS_ACUITY_SCORE: 31

## 2023-07-12 PROBLEM — Z98.891 S/P REPEAT LOW TRANSVERSE C-SECTION: Status: ACTIVE | Noted: 2023-07-12

## 2023-07-12 LAB
GLUCOSE BLDC GLUCOMTR-MCNC: 108 MG/DL (ref 70–99)
GLUCOSE BLDC GLUCOMTR-MCNC: 77 MG/DL (ref 70–99)
GLUCOSE BLDC GLUCOMTR-MCNC: 81 MG/DL (ref 70–99)
GLUCOSE BLDC GLUCOMTR-MCNC: 95 MG/DL (ref 70–99)
HGB BLD-MCNC: 11.6 G/DL (ref 11.7–15.7)
T PALLIDUM AB SER QL: NONREACTIVE

## 2023-07-12 PROCEDURE — 88307 TISSUE EXAM BY PATHOLOGIST: CPT | Mod: 26 | Performed by: PATHOLOGY

## 2023-07-12 PROCEDURE — 88307 TISSUE EXAM BY PATHOLOGIST: CPT | Mod: TC | Performed by: OBSTETRICS & GYNECOLOGY

## 2023-07-12 PROCEDURE — 36415 COLL VENOUS BLD VENIPUNCTURE: CPT | Performed by: OBSTETRICS & GYNECOLOGY

## 2023-07-12 PROCEDURE — 99207 PR NO BILLABLE SERVICE THIS VISIT: CPT | Performed by: NURSE PRACTITIONER

## 2023-07-12 PROCEDURE — 120N000012 HC R&B POSTPARTUM

## 2023-07-12 PROCEDURE — 250N000013 HC RX MED GY IP 250 OP 250 PS 637: Performed by: OBSTETRICS & GYNECOLOGY

## 2023-07-12 PROCEDURE — 85018 HEMOGLOBIN: CPT | Performed by: OBSTETRICS & GYNECOLOGY

## 2023-07-12 PROCEDURE — 250N000011 HC RX IP 250 OP 636: Performed by: OBSTETRICS & GYNECOLOGY

## 2023-07-12 RX ORDER — OXYTOCIN 10 [USP'U]/ML
10 INJECTION, SOLUTION INTRAMUSCULAR; INTRAVENOUS
Status: DISCONTINUED | OUTPATIENT
Start: 2023-07-12 | End: 2023-07-14 | Stop reason: HOSPADM

## 2023-07-12 RX ORDER — PROCHLORPERAZINE MALEATE 10 MG
10 TABLET ORAL EVERY 6 HOURS PRN
Status: DISCONTINUED | OUTPATIENT
Start: 2023-07-12 | End: 2023-07-14 | Stop reason: HOSPADM

## 2023-07-12 RX ORDER — LABETALOL HYDROCHLORIDE 5 MG/ML
20 INJECTION, SOLUTION INTRAVENOUS
Status: DISCONTINUED | OUTPATIENT
Start: 2023-07-12 | End: 2023-07-14 | Stop reason: HOSPADM

## 2023-07-12 RX ORDER — LIDOCAINE 40 MG/G
CREAM TOPICAL
Status: DISCONTINUED | OUTPATIENT
Start: 2023-07-12 | End: 2023-07-14 | Stop reason: HOSPADM

## 2023-07-12 RX ORDER — NICOTINE POLACRILEX 4 MG
15-30 LOZENGE BUCCAL
Status: DISCONTINUED | OUTPATIENT
Start: 2023-07-12 | End: 2023-07-14 | Stop reason: HOSPADM

## 2023-07-12 RX ORDER — ONDANSETRON 4 MG/1
4 TABLET, ORALLY DISINTEGRATING ORAL EVERY 6 HOURS PRN
Status: DISCONTINUED | OUTPATIENT
Start: 2023-07-12 | End: 2023-07-14 | Stop reason: HOSPADM

## 2023-07-12 RX ORDER — CARBOPROST TROMETHAMINE 250 UG/ML
250 INJECTION, SOLUTION INTRAMUSCULAR
Status: DISCONTINUED | OUTPATIENT
Start: 2023-07-12 | End: 2023-07-14 | Stop reason: HOSPADM

## 2023-07-12 RX ORDER — KETOROLAC TROMETHAMINE 30 MG/ML
30 INJECTION, SOLUTION INTRAMUSCULAR; INTRAVENOUS EVERY 6 HOURS
Status: COMPLETED | OUTPATIENT
Start: 2023-07-12 | End: 2023-07-12

## 2023-07-12 RX ORDER — ONDANSETRON 2 MG/ML
4 INJECTION INTRAMUSCULAR; INTRAVENOUS EVERY 6 HOURS PRN
Status: DISCONTINUED | OUTPATIENT
Start: 2023-07-12 | End: 2023-07-14 | Stop reason: HOSPADM

## 2023-07-12 RX ORDER — MISOPROSTOL 200 UG/1
400 TABLET ORAL
Status: DISCONTINUED | OUTPATIENT
Start: 2023-07-12 | End: 2023-07-14 | Stop reason: HOSPADM

## 2023-07-12 RX ORDER — MISOPROSTOL 200 UG/1
800 TABLET ORAL
Status: DISCONTINUED | OUTPATIENT
Start: 2023-07-12 | End: 2023-07-14 | Stop reason: HOSPADM

## 2023-07-12 RX ORDER — BISACODYL 10 MG
10 SUPPOSITORY, RECTAL RECTAL DAILY PRN
Status: DISCONTINUED | OUTPATIENT
Start: 2023-07-14 | End: 2023-07-14 | Stop reason: HOSPADM

## 2023-07-12 RX ORDER — AMOXICILLIN 250 MG
2 CAPSULE ORAL 2 TIMES DAILY
Status: DISCONTINUED | OUTPATIENT
Start: 2023-07-12 | End: 2023-07-14 | Stop reason: HOSPADM

## 2023-07-12 RX ORDER — DEXTROSE, SODIUM CHLORIDE, SODIUM LACTATE, POTASSIUM CHLORIDE, AND CALCIUM CHLORIDE 5; .6; .31; .03; .02 G/100ML; G/100ML; G/100ML; G/100ML; G/100ML
INJECTION, SOLUTION INTRAVENOUS CONTINUOUS
Status: DISCONTINUED | OUTPATIENT
Start: 2023-07-12 | End: 2023-07-14 | Stop reason: HOSPADM

## 2023-07-12 RX ORDER — AMOXICILLIN 250 MG
1 CAPSULE ORAL 2 TIMES DAILY
Status: DISCONTINUED | OUTPATIENT
Start: 2023-07-12 | End: 2023-07-14 | Stop reason: HOSPADM

## 2023-07-12 RX ORDER — MODIFIED LANOLIN
OINTMENT (GRAM) TOPICAL
Status: DISCONTINUED | OUTPATIENT
Start: 2023-07-12 | End: 2023-07-14 | Stop reason: HOSPADM

## 2023-07-12 RX ORDER — METOCLOPRAMIDE 10 MG/1
10 TABLET ORAL EVERY 6 HOURS PRN
Status: DISCONTINUED | OUTPATIENT
Start: 2023-07-12 | End: 2023-07-14 | Stop reason: HOSPADM

## 2023-07-12 RX ORDER — OXYCODONE HYDROCHLORIDE 5 MG/1
5 TABLET ORAL EVERY 4 HOURS PRN
Status: DISCONTINUED | OUTPATIENT
Start: 2023-07-12 | End: 2023-07-14 | Stop reason: HOSPADM

## 2023-07-12 RX ORDER — METOCLOPRAMIDE HYDROCHLORIDE 5 MG/ML
10 INJECTION INTRAMUSCULAR; INTRAVENOUS EVERY 6 HOURS PRN
Status: DISCONTINUED | OUTPATIENT
Start: 2023-07-12 | End: 2023-07-14 | Stop reason: HOSPADM

## 2023-07-12 RX ORDER — OXYTOCIN/0.9 % SODIUM CHLORIDE 30/500 ML
100-340 PLASTIC BAG, INJECTION (ML) INTRAVENOUS CONTINUOUS PRN
Status: DISCONTINUED | OUTPATIENT
Start: 2023-07-12 | End: 2023-07-14 | Stop reason: HOSPADM

## 2023-07-12 RX ORDER — IBUPROFEN 400 MG/1
800 TABLET, FILM COATED ORAL EVERY 6 HOURS
Status: DISCONTINUED | OUTPATIENT
Start: 2023-07-13 | End: 2023-07-14 | Stop reason: HOSPADM

## 2023-07-12 RX ORDER — ACETAMINOPHEN 325 MG/1
975 TABLET ORAL EVERY 6 HOURS
Status: DISCONTINUED | OUTPATIENT
Start: 2023-07-12 | End: 2023-07-14 | Stop reason: HOSPADM

## 2023-07-12 RX ORDER — SIMETHICONE 80 MG
80 TABLET,CHEWABLE ORAL 4 TIMES DAILY PRN
Status: DISCONTINUED | OUTPATIENT
Start: 2023-07-12 | End: 2023-07-14 | Stop reason: HOSPADM

## 2023-07-12 RX ORDER — METHYLERGONOVINE MALEATE 0.2 MG/ML
200 INJECTION INTRAVENOUS
Status: DISCONTINUED | OUTPATIENT
Start: 2023-07-12 | End: 2023-07-14 | Stop reason: HOSPADM

## 2023-07-12 RX ORDER — TRANEXAMIC ACID 10 MG/ML
1 INJECTION, SOLUTION INTRAVENOUS EVERY 30 MIN PRN
Status: DISCONTINUED | OUTPATIENT
Start: 2023-07-12 | End: 2023-07-14 | Stop reason: HOSPADM

## 2023-07-12 RX ORDER — HYDROCORTISONE 25 MG/G
CREAM TOPICAL 3 TIMES DAILY PRN
Status: DISCONTINUED | OUTPATIENT
Start: 2023-07-12 | End: 2023-07-14 | Stop reason: HOSPADM

## 2023-07-12 RX ORDER — OXYTOCIN/0.9 % SODIUM CHLORIDE 30/500 ML
340 PLASTIC BAG, INJECTION (ML) INTRAVENOUS CONTINUOUS PRN
Status: DISCONTINUED | OUTPATIENT
Start: 2023-07-12 | End: 2023-07-14 | Stop reason: HOSPADM

## 2023-07-12 RX ORDER — DEXTROSE MONOHYDRATE 25 G/50ML
25-50 INJECTION, SOLUTION INTRAVENOUS
Status: DISCONTINUED | OUTPATIENT
Start: 2023-07-12 | End: 2023-07-14 | Stop reason: HOSPADM

## 2023-07-12 RX ORDER — PROCHLORPERAZINE 25 MG
25 SUPPOSITORY, RECTAL RECTAL EVERY 12 HOURS PRN
Status: DISCONTINUED | OUTPATIENT
Start: 2023-07-12 | End: 2023-07-14 | Stop reason: HOSPADM

## 2023-07-12 RX ORDER — NIFEDIPINE 10 MG/1
10-20 CAPSULE ORAL
Status: DISCONTINUED | OUTPATIENT
Start: 2023-07-12 | End: 2023-07-14 | Stop reason: HOSPADM

## 2023-07-12 RX ADMIN — ACETAMINOPHEN 975 MG: 325 TABLET, FILM COATED ORAL at 03:32

## 2023-07-12 RX ADMIN — SENNOSIDES AND DOCUSATE SODIUM 1 TABLET: 50; 8.6 TABLET ORAL at 23:52

## 2023-07-12 RX ADMIN — SODIUM CHLORIDE, SODIUM LACTATE, POTASSIUM CHLORIDE, CALCIUM CHLORIDE AND DEXTROSE MONOHYDRATE: 5; 600; 310; 30; 20 INJECTION, SOLUTION INTRAVENOUS at 03:10

## 2023-07-12 RX ADMIN — KETOROLAC TROMETHAMINE 30 MG: 30 INJECTION, SOLUTION INTRAMUSCULAR; INTRAVENOUS at 05:35

## 2023-07-12 RX ADMIN — SENNOSIDES AND DOCUSATE SODIUM 1 TABLET: 50; 8.6 TABLET ORAL at 09:28

## 2023-07-12 RX ADMIN — KETOROLAC TROMETHAMINE 30 MG: 30 INJECTION, SOLUTION INTRAMUSCULAR; INTRAVENOUS at 11:42

## 2023-07-12 RX ADMIN — ACETAMINOPHEN 975 MG: 325 TABLET, FILM COATED ORAL at 09:28

## 2023-07-12 RX ADMIN — KETOROLAC TROMETHAMINE 30 MG: 30 INJECTION, SOLUTION INTRAMUSCULAR; INTRAVENOUS at 17:37

## 2023-07-12 RX ADMIN — ACETAMINOPHEN 975 MG: 325 TABLET, FILM COATED ORAL at 16:51

## 2023-07-12 ASSESSMENT — ACTIVITIES OF DAILY LIVING (ADL)
ADLS_ACUITY_SCORE: 22
CONCENTRATING,_REMEMBERING_OR_MAKING_DECISIONS_DIFFICULTY: NO
DOING_ERRANDS_INDEPENDENTLY_DIFFICULTY: NO
ADLS_ACUITY_SCORE: 22
WALKING_OR_CLIMBING_STAIRS_DIFFICULTY: NO
ADLS_ACUITY_SCORE: 25
ADLS_ACUITY_SCORE: 31
VISION_MANAGEMENT: GLASSES AND CONTACTS
ADLS_ACUITY_SCORE: 25
ADLS_ACUITY_SCORE: 22
FALL_HISTORY_WITHIN_LAST_SIX_MONTHS: YES
DIFFICULTY_EATING/SWALLOWING: NO
ADLS_ACUITY_SCORE: 22
DRESSING/BATHING_DIFFICULTY: NO
ADLS_ACUITY_SCORE: 25
WEAR_GLASSES_OR_BLIND: YES
ADLS_ACUITY_SCORE: 25
NUMBER_OF_TIMES_PATIENT_HAS_FALLEN_WITHIN_LAST_SIX_MONTHS: 1
CHANGE_IN_FUNCTIONAL_STATUS_SINCE_ONSET_OF_CURRENT_ILLNESS/INJURY: NO

## 2023-07-12 NOTE — ANESTHESIA CARE TRANSFER NOTE
Patient: Naa Yap    Procedure: Procedure(s):  REPEAT  SECTION       Diagnosis: Supervision of high-risk pregnancy of elderly multigravida [O09.529]  History of  section complicating pregnancy [O34.219]  Diagnosis Additional Information: No value filed.    Anesthesia Type:   Spinal     Note:    Oropharynx: oropharynx clear of all foreign objects and spontaneously breathing  Level of Consciousness: awake  Oxygen Supplementation: room air    Independent Airway: airway patency satisfactory and stable  Dentition: dentition unchanged  Vital Signs Stable: post-procedure vital signs reviewed and stable  Report to RN Given: handoff report given  Patient transferred to: Labor and Delivery  Comments: Transferred to OB PACU recovery, spontaneous respirations on room air with oxygen saturations maintained greater than 95%. SpO2, NiBP, and EKG monitors and alarms on and functioning, report on patient's clinical status given to OB recovery RN, RN questions answered, patient in hospital cart with siderails up Oxytocin 30 units in 500mL infusion connected to IV infusion pump in recovery bay and programmed to 100 mL/hr at handoff of care.    Handoff Report: Identifed the Patient, Identified the Reponsible Provider, Reviewed the pertinent medical history, Discussed the surgical course, Reviewed Intra-OP anesthesia mangement and issues during anesthesia, Set expectations for post-procedure period and Allowed opportunity for questions and acknowledgement of understanding      Vitals:  Vitals Value Taken Time   BP     Temp     Pulse     Resp     SpO2         Electronically Signed By: TALIA Rehman CRNA  2023  11:27 PM

## 2023-07-12 NOTE — H&P
"Preoperative Bridge Note    H&P/Prenatal record reviewed, patient examined, no changes to H&P since last seen in clinic.     Presents to L&D with contractions and elevated blood pressures.  She is scheduled for  section next Thursday.  She is not interested in trial of labor    Vital signs:  Temp: 97.5  F (36.4  C) Temp src: Temporal BP: (!) 154/89     Resp: 18            Estimated body mass index is 26.02 kg/m  as calculated from the following:    Height as of 4/3/23: 1.67 m (5' 5.75\").    Weight as of 7/10/23: 72.6 kg (160 lb).      Heart: CVS exam: S1, S2 normal, no murmur, click, rub or gallop, regular rate and rhythm, chest is clear without rales or wheezing.   Lungs: LUNGS:  CTA B/L, no wheezing or crackles.   EFW:  6lb       Results for orders placed or performed during the hospital encounter of 23   CBC with platelets     Status: Normal   Result Value Ref Range    WBC Count 9.4 4.0 - 11.0 10e3/uL    RBC Count 4.64 3.80 - 5.20 10e6/uL    Hemoglobin 13.7 11.7 - 15.7 g/dL    Hematocrit 41.0 35.0 - 47.0 %    MCV 88 78 - 100 fL    MCH 29.5 26.5 - 33.0 pg    MCHC 33.4 31.5 - 36.5 g/dL    RDW 13.2 10.0 - 15.0 %    Platelet Count 157 150 - 450 10e3/uL   Protein  random urine     Status: None   Result Value Ref Range    Total Protein Urine mg/dL 4.7   mg/dL    Total Protein UR MG/MG CR 0.17 0.00 - 0.20 mg/mg Cr    Creatinine Urine mg/dL 27.6 mg/dL   INR     Status: Normal   Result Value Ref Range    INR 0.91 0.85 - 1.15   Partial thromboplastin time (PTT)     Status: Normal   Result Value Ref Range    aPTT 25 22 - 38 Seconds   AST     Status: Normal   Result Value Ref Range    AST 14 0 - 45 U/L   ALT     Status: Normal   Result Value Ref Range    ALT 8 0 - 50 U/L   Creatinine     Status: Normal   Result Value Ref Range    Creatinine 0.77 0.51 - 0.95 mg/dL    GFR Estimate >90 >60 mL/min/1.73m2   Extra Tube     Status: None (In process)    Narrative    The following orders were created for panel order " Extra Tube.  Procedure                               Abnormality         Status                     ---------                               -----------         ------                     Extra Red Top Tube[[id number]]                               In process                   Please view results for these tests on the individual orders.   Adult Type and Screen     Status: None   Result Value Ref Range    ABO/RH(D) O POS     Antibody Screen Negative Negative    SPECIMEN EXPIRATION DATE [id number]    ABO/Rh type and screen     Status: None    Narrative    The following orders were created for panel order ABO/Rh type and screen.  Procedure                               Abnormality         Status                     ---------                               -----------         ------                     Adult Type and Screen[[id number]]                            Final result                 Please view results for these tests on the individual orders.      @ 38  GHTN  Possible early labor and prior  requesting repeat  AMA  GDMA2  Fetal renal pyelectasis     Will proceed with Repeat  section now due to GHTN and possible early labor  Patient given opportunity to ask questions.  Consents signed.    Marianne Peck MD  10:24 PM  2023

## 2023-07-12 NOTE — PLAN OF CARE
[id number]: Patient arrived from labor and delivery at 0230. ID bands verified and double checked.  services used to review room safety with patient. Vital signs stable. Postpartum assessment WDL. Incisional dressing intact- no see through drainage noted. Pain controlled with Tylenol/Toradol. Patient ambulating with assist of two to bathroom. Barrera removed at 0600, due to void. Patient denies passing gas. Breastfeeding on cue with minimal assist. Bottle feeding formula per parents request. Patient and infant bonding well. Will continue with current plan of care.

## 2023-07-12 NOTE — PROVIDER NOTIFICATION
07/11/23 2028   Provider Notification   Provider Name/Title Dr CARLIN Peck   Method of Notification Electronic Page;Phone   Request Evaluate - Remote   Notification Reason Patient Arrived;Uterine Activity;Pain;SVE;Maternal Vital Sign Change     Provider updated about patient arrival to [facility]. Discussed pain level, SVE, FHR/UC tracing, and serial blood pressures. Provider states to administer LR Bolus of 500ml IV, 975mg of tylenol PO, and to run PI[facility] labs (AST/ALT/Creatinine/CBC/INR/PTT/protein-creatinine ratio and also a T&S). Will update provider with patient pain, lab results, and continued serial BPs after IV hydration and Tylenol.

## 2023-07-12 NOTE — OP NOTE
Section Procedure Note  Procedure Date:  2023    Pre-operative Diagnosis:   1.  38w0d   2. Repeat  section, laboring  3. GHTN  4. AMA  5. GDMA2  6. Fetal renal pyelectasis    Post-operative Diagnosis: Same as pre-operative    Procedure Performed:    1. Repeat  section    Name of Surgeon: Marianne Peck MD    Type of Anesthesia Used:  Spinal    Description of Findings:    Infant's Sex: Boy  Crosby Weight: 3.04 kg (6 lb 11.2 oz)    1 Minute 5 Minute 10 Minute   Apgar Totals: 9   9        General findings: Normal tubes and ovaries.  Very thin lower uterine segment    Quantitative Blood Loss: Delivery QBL (mL): 270  Specimen(s): Placenta  Complications: none  Drains: hines, clear urine at end of procedure  Condition:  Stable    Primary OB: Dr. Anna Marie June    Consent: The risks, benefits, complications, treatment options, non-operative alternatives were discussed with the patient. Risks to include but not limited to infection, bleeding possibly requiring blood transfusion, injury to surrounding organs such as uterus/ovaries/bladder/bowel/nerves/vessels with possible need for further surgery/procedure, injury to baby, VTE/PE, and remote risk of maternal/fetal death. The patient concurred with the proposed plan, giving informed consent.   The site of the surgery is a midline structure, the uterus. Venous thrombosis prophylaxis has been ordered. Pre-operative antibiotics were ordered and given.     Procedure Details:  The patient was taken to the operating room.  Spinal anesthesia was placed.  She was then placed in dorsal supine position with leftward tilt.  She was then prepped and draped in the usual sterile fashion.  A timeout was performed.  Anesthesia was found to be adequate.    A scalpel was then used to make a Pfannenstiel incision and carried down to the underlying fascia.  A transverse incision was made in the fascia, and the fascial incision was extended laterally.   Sharp and blunt dissection was then used to separate the rectus muscle from fascia superiorly and inferiorly.  The rectus muscles were then divided in the midline and the peritoneum was identified and entered bluntly.  The peritoneal incision was extended and the Randolph retractor was placed for visualization.  A low transverse uterine incision was made with a scalpel.  Upon uterine entry the incision was extended manually.  The fetal head was delivered atraumatically through the incision, followed by the remainder of the infant.  After, the umbilical cord was clamped and cut and the infant was handed to the awaiting  nurse practitioner.  Cord blood was obtained.  The placenta was then delivered by uterine massage and gentle cord traction.  The uterus was cleared of the remaining clots and debris.  The hysterotomy incision was then closed with 0-Vicryl in running locked layer followed by 0 Figure of eight stitch near right corner for small stable hematoma. Hemostasis was was noted. Irrigation was performed. Surgicel powder was applied to hysterotomy site. The rectus muscles and fascia were inspected and hemostasis was obtained.  The fascia was then closed in a running fashion with 0-Vicryl.  The subcutaneous tissue was irrigated and hemostasis was assured.  Subcutaneous layer was then re-approximated with 3-0 plain gut in interrupted fashion. The skin was closed with 4-0 Monocryl in subcuticular fashion.      Instrument, sponge, and needle counts were correct prior to abdominal closure and at the conclusion of the case.      The patient is in stable condition at the end of the procedure.

## 2023-07-12 NOTE — PROGRESS NOTES
Hospitalist Consult Note   2023  0745    Consult received for glycemic management. Chart reviewed, pt is 37 years old,  38w0d female, has a history of diet controlled GDM. S/p repeat  section on 23 at 2306 under spinal anesthesia by Dr. Peck. PMHx includes gestational HTN, gestational diabetes, fetal renal pyelectasis, AMA.     POD#1 Glucose ranging 70s-80s.     Plan:     Per GDM protocol:  - Check blood glucose: before meals and at HS.   - Discontinue blood glucose monitoring if BG is within 70-99 mg/dL for 24 hours post delivery.    - If BG is not within 70-99 mg/dL notify provider for additional follow-up.  - With history of GDM do not anticipate she will have any insulin needs post delivery and can follow up with PCP post discharge for follow up testing as needed. Consider checking fasting serum glucose or HbA1c at 4-6 weeks postpartum.      The Hospitalist service will sign off. Please call with any questions.      TALIA [facility]  Hospitalist Service  Pager: [phone] (7a - 6p)

## 2023-07-12 NOTE — ANESTHESIA POSTPROCEDURE EVALUATION
Patient: Naa Yap    Procedure: Procedure(s):  REPEAT  SECTION       Anesthesia Type:  Spinal    Note:     Postop Pain Control: Uneventful            Sign Out: Well controlled pain   PONV:    Neuro/Psych: Uneventful            Sign Out: Acceptable/Baseline neuro status   Airway/Respiratory: Uneventful            Sign Out: Acceptable/Baseline resp. status   CV/Hemodynamics: Uneventful            Sign Out: Acceptable CV status; No obvious hypovolemia; No obvious fluid overload   Other NRE:    DID A NON-ROUTINE EVENT OCCUR?            Last vitals:  Vitals Value Taken Time   BP     Temp     Pulse     Resp     SpO2         Electronically Signed By: Ja Ramírez MD  2023  5:08 AM

## 2023-07-12 NOTE — PROVIDER NOTIFICATION
07/11/23 2151   Provider Notification   Provider Name/Title Dr CARLIN Peck   Method of Notification Electronic Page;Phone   Request Evaluate - Remote   Notification Reason Status Update   Pt IV bolus is completed, and tylenol given and has had time to absorb. Reviewed serial BPs with provider and that patient reports stronger contractions than prior to IV bolus. Per provider she desires to perform c section on patient tonight and to start prepping her for OR. RN to bedside and discusses POC with patient, she agrees with POC and denies desire for TOLAC. Patient sister is at bedside and will contact patient .

## 2023-07-12 NOTE — CARE PLAN
Data: Naa Yap transferred from PACU at 0220.  Action: Report given to JYOTHI Benitez.  Accompanied by Registered Nurse. Oriented family to surroundings. Call light within reach. ID bands double-checked with transferring RN and parents  Response: Patient tolerated transfer and is stable.

## 2023-07-12 NOTE — TELEPHONE ENCOUNTER
OB Triage Call      Is patient's OB/Midwife with the [facility] or [facility]? [facility]- Proceed with triage     Reason for call: Patient is laboring.    Assessment: Patient has been having contractions all day.  They are now 5 min apart.  Patient denies water breaking and no bleeding.  Patient is a repeat C/S    Plan: Go to L&D    Patient plans to deliver at [facility]    Patient's primary OB Provider is [facility].      Per protocol recommendations Patient to be evaluated in L&D. Patient's primary OB is [facility].  Labor and delivery at [facility] ([phone]) notified of patient's pending arrival.  Report given to Krystina.      Is patient's delivering hospital on divert? No      38w0d    Estimated Date of Delivery: 2023        OB History    Para Term  AB Living   12 1 1 0 10 1   SAB IAB Ectopic Multiple Live Births   10 0 0 0 1      # Outcome Date GA Lbr Gilson/2nd Weight Sex Delivery Anes PTL Lv   12 Current            11 Term 21 38w0d  3.8 kg (8 lb 6 oz) M CS-Unspec  N SONIA      Birth Comments: PRIMARY C/S PER MD B/C OF PRE-E BUT NO IOL EVEN OFFERED. RECOMMENDED TO THEM AT 37 WEEKS BUT  PUSHED FOR ONE WEEK LATER AT 38 WEEKS      Name: Aarti   10 2014           9 2013           8 2012           7 2011           6 2010           5 2009           4 2008           3 2007           2 2006           1 2005               No results found for: GBS       Sangeetha Shankar RN 23 7:23 PM  [facility] Nurse Advisor    Reason for Disposition    [1] History of prior delivery (multipara) AND [2] contractions < 10 minutes apart AND [3] present 1 hour    Additional Information    Negative: Passed out (i.e., lost consciousness, collapsed and was not responding)    Negative: Shock suspected (e.g., cold/pale/clammy skin, too weak to stand, low BP, rapid pulse)    Negative: Difficult to awaken or acting confused (e.g.,  "disoriented, slurred speech)    Negative: [1] SEVERE abdominal pain (e.g., excruciating) AND [2] constant AND [3] present > 1 hour    Negative: Severe bleeding (e.g., continuous red blood from vagina, or large blood clots)    Negative: Umbilical cord hanging out of the vagina (shiny, white, curled appearance, \"like telephone cord\")    Negative: Uncontrollable urge to push (i.e., feels like baby is coming out now)    Negative: Can see baby    Negative: Sounds like a life-threatening emergency to the triager    Negative: Pregnant < 37 weeks (i.e., )    Negative: [1] Uncertain delivery date AND [2] possibly pregnant < 37 weeks (i.e., )    Negative: [1] First baby (primipara) AND [2] contractions < 6 minutes apart  AND [3] present 2 hours    Protocols used: PREGNANCY - LABOR-A-      "

## 2023-07-12 NOTE — ANESTHESIA PREPROCEDURE EVALUATION
Anesthesia Pre-Procedure Evaluation    Patient: Naa Yap   MRN: [id number] : 1985        Procedure : Procedure(s):  REPEAT  SECTION          Past Medical History:   Diagnosis Date     Gestational diabetes      Known health problems: none      Pre-eclampsia       Past Surgical History:   Procedure Laterality Date      SECTION        No Known Allergies   Social History     Tobacco Use     Smoking status: Never     Smokeless tobacco: Never   Substance Use Topics     Alcohol use: Not Currently      Wt Readings from Last 1 Encounters:   07/10/23 72.6 kg (160 lb)        Anesthesia Evaluation            ROS/MED HX  ENT/Pulmonary:       Neurologic:       Cardiovascular:     (+) hypertension (Pre-eclampsia)-----    METS/Exercise Tolerance:     Hematologic:       Musculoskeletal:       GI/Hepatic:       Renal/Genitourinary:       Endo: Comment: Gestational DM - on insulin      Psychiatric/Substance Use:       Infectious Disease:       Malignancy:       Other:            Physical Exam    Airway        Mallampati: II   TM distance: > 3 FB   Neck ROM: full   Mouth opening: > 3 cm    Respiratory Devices and Support         Dental       (+) Minor Abnormalities - some fillings, tiny chips      Cardiovascular          Rhythm and rate: regular     Pulmonary           breath sounds clear to auscultation           OUTSIDE LABS:  CBC:   Lab Results   Component Value Date    WBC 9.4 2023    WBC 8.9 2023    HGB 13.7 2023    HGB 12.4 2023    HCT 41.0 2023    HCT 37.4 2023     2023     2023     BMP:   Lab Results   Component Value Date    CR 0.54 2023     COAGS:   Lab Results   Component Value Date    PTT 25 2023    INR 0.91 2023     POC: No results found for: BGM, HCG, HCGS  HEPATIC:   Lab Results   Component Value Date    ALT 19 2023    AST 18 2023     OTHER: No results found for: PH, LACT, A1C, BANDAR, PHOS, MAG, LIPASE,  AMYLASE, TSH, T4, T3, CRP, SED    Anesthesia Plan    ASA Status:  3      Anesthesia Type: Spinal.              Consents    Anesthesia Plan(s) and associated risks, benefits, and realistic alternatives discussed. Questions answered and patient/representative(s) expressed understanding.    - Discussed:     - Discussed with:  Patient         Postoperative Care    Pain management: intrathecal morphine.   PONV prophylaxis: Ondansetron (or other 5HT-3)     Comments:    Other Comments: Duramorph and fentanyl for spinal            Ja Ramírez MD

## 2023-07-12 NOTE — ANESTHESIA PROCEDURE NOTES
"Intrathecal Procedure Note    Pre-Procedure   Staff -        Anesthesiologist:  Ja Ramírez MD       Performed By: Anesthesiologist       Location: OR       Pre-Anesthestic Checklist: patient identified, IV checked, risks and benefits discussed, informed consent, monitors and equipment checked, pre-op evaluation and at physician/surgeon's request  Timeout:       Correct Patient: Yes        Correct Procedure: Yes        Correct Site: Yes        Correct Position: Yes   Procedure Documentation  Procedure: intrathecal       Patient Position: sitting       Patient Prep/Sterile Barriers: sterile gloves, mask, patient draped       Skin prep: Chloraprep       Insertion Site: L3-4. (midline approach).       Spinal Needle Type: Pencan       Introducer used       Introducer: 20 G       # of attempts: 1 and  # of redirects:  0    Assessment/Narrative         Paresthesias: No.       CSF fluid: clear.     Comments:  Duramorph and fentanyl dosed along with bupivacaine.  T4 sensory level confirmed bilaterally prior to incision.   The patient was prepped and draped in a sterile fashion.  Topical anesthesia was injected subcutaneously using 1% lidocaine.  A 24G Pencan needle was advanced via a 20 G introducer at the the L3-4 level.  Clear CSF obtained, with birefringence on aspiration.  CSF freely aspirated after injection of 10.5 mg bupivacaine.  No paresthesias reported by patient, who tolerated the procedure well.      FOR [facility] ([facility]) ONLY:   Pain Team Contact information: please page the Pain Team Via [employer]. Search \"Pain\". During daytime hours, please page the attending first. At night please page the resident first.      "

## 2023-07-12 NOTE — CARE PLAN
Viable male infant born via  at 2306. See delivery summary. [facility]  services used before, during and after procedure.

## 2023-07-13 ENCOUNTER — TELEPHONE (OUTPATIENT)
Dept: OBGYN | Facility: CLINIC | Age: 38
End: 2023-07-13

## 2023-07-13 PROBLEM — O13.9 GESTATIONAL HYPERTENSION: Status: ACTIVE | Noted: 2023-07-13

## 2023-07-13 LAB
GLUCOSE BLDC GLUCOMTR-MCNC: 111 MG/DL (ref 70–99)
GLUCOSE BLDC GLUCOMTR-MCNC: 76 MG/DL (ref 70–99)
PATH REPORT.COMMENTS IMP SPEC: NORMAL
PATH REPORT.COMMENTS IMP SPEC: NORMAL
PATH REPORT.FINAL DX SPEC: NORMAL
PATH REPORT.GROSS SPEC: NORMAL
PATH REPORT.MICROSCOPIC SPEC OTHER STN: NORMAL
PATH REPORT.RELEVANT HX SPEC: NORMAL
PHOTO IMAGE: NORMAL

## 2023-07-13 PROCEDURE — 120N000012 HC R&B POSTPARTUM

## 2023-07-13 PROCEDURE — 250N000013 HC RX MED GY IP 250 OP 250 PS 637: Performed by: OBSTETRICS & GYNECOLOGY

## 2023-07-13 RX ORDER — OXYCODONE HYDROCHLORIDE 5 MG/1
5 TABLET ORAL EVERY 4 HOURS PRN
Qty: 5 TABLET | Refills: 0 | Status: SHIPPED | OUTPATIENT
Start: 2023-07-13 | End: 2023-08-28

## 2023-07-13 RX ORDER — ACETAMINOPHEN 325 MG/1
975 TABLET ORAL EVERY 6 HOURS
Qty: 30 TABLET | Refills: 3 | Status: SHIPPED | OUTPATIENT
Start: 2023-07-13 | End: 2023-08-28

## 2023-07-13 RX ORDER — AMOXICILLIN 250 MG
1 CAPSULE ORAL 2 TIMES DAILY
Qty: 30 TABLET | Refills: 3 | Status: SHIPPED | OUTPATIENT
Start: 2023-07-13 | End: 2023-08-28

## 2023-07-13 RX ORDER — IBUPROFEN 800 MG/1
800 TABLET, FILM COATED ORAL EVERY 6 HOURS
Qty: 30 TABLET | Refills: 3 | Status: SHIPPED | OUTPATIENT
Start: 2023-07-13 | End: 2023-08-28

## 2023-07-13 RX ADMIN — ACETAMINOPHEN 975 MG: 325 TABLET, FILM COATED ORAL at 11:25

## 2023-07-13 RX ADMIN — IBUPROFEN 800 MG: 400 TABLET ORAL at 05:07

## 2023-07-13 RX ADMIN — SENNOSIDES AND DOCUSATE SODIUM 1 TABLET: 50; 8.6 TABLET ORAL at 18:00

## 2023-07-13 RX ADMIN — IBUPROFEN 800 MG: 400 TABLET ORAL at 18:00

## 2023-07-13 RX ADMIN — SENNOSIDES AND DOCUSATE SODIUM 2 TABLET: 50; 8.6 TABLET ORAL at 08:24

## 2023-07-13 RX ADMIN — ACETAMINOPHEN 975 MG: 325 TABLET, FILM COATED ORAL at 18:00

## 2023-07-13 RX ADMIN — ACETAMINOPHEN 975 MG: 325 TABLET, FILM COATED ORAL at 04:54

## 2023-07-13 RX ADMIN — IBUPROFEN 800 MG: 400 TABLET ORAL at 11:25

## 2023-07-13 ASSESSMENT — ACTIVITIES OF DAILY LIVING (ADL)
ADLS_ACUITY_SCORE: 25
ADLS_ACUITY_SCORE: 24
ADLS_ACUITY_SCORE: 25
ADLS_ACUITY_SCORE: 24
ADLS_ACUITY_SCORE: 25

## 2023-07-13 NOTE — TELEPHONE ENCOUNTER
----- Message from Anna Marie June MD sent at 7/13/2023 10:25 AM CDT -----  Please schedule for RN BP check Monday 7/11/23 C/S    BP check scheduled for Monday 7/17 at 1045am - sent pt a mychart as she is still in patient.    Christina Riggins RN on 7/13/2023 at 10:31 AM

## 2023-07-13 NOTE — LACTATION NOTE
Initial and discharge visit with Mother and baby boy.  Utilized a Mandarin  for visit.    Mother states breast feeding is going well but is concerned she doesn't have any milk.  She states she saw some colostrum yesterday and she was concerned at how sticky it was.  LC reassured her that it is normal and educated Mother on what colostrum is made of.  Physiology of milk supply and milk production explained to Mother.  Mother also states that she has larger nipples and baby has a hard time latching on.  LC educated Mother on techniques to help get baby onto larger nipples and reviewed the importance of getting a deep latch with feedings versus a shallow latch  Mother is pumping occasionally. LC reviewed the importance of getting stimulation to her breasts to help with her long term milk supply and encouraged her to make sure she always breast feeds first and then supplement with a small amount of formula via the bottle.  Mother educated on normal  behavior, focusing on normal feeding patterns from birth to day 3 of life. LC reviewed techniques for waking a sleepy baby to breast feed. Breast feeding general information reviewed.  Encouraged rooming in and skin to skin.    Appreciative of visit.  No further questions at this time. Will follow as needed.   Jazmine Mckoy RN, IBCLC

## 2023-07-13 NOTE — PLAN OF CARE
Goal Outcome Evaluation:      Plan of Care Reviewed With: patient    Overall Patient Progress: improvingOverall Patient Progress: improving       Vitals stable. Up ad rigoberto well. Voiding without difficulty. Pain controlled with tylenol and ibuprofen. Declines need for oxycodone.  present this morning from [id number]. Breastfeeding going well. Supplementing with similac via bottle.

## 2023-07-13 NOTE — PROGRESS NOTES
Post Partum Progress Note    Subjective:  Patient is doing well. Increased pain when not taking pain medications, otherwise well controlled.  No complaints. Moderate lochia.  Tolerating PO and ambulating without any issues. Denies any fever, chills, SOB, chest pain, N/V,  headache, dizziness. Some LE edema. Planning on breast feeding.     Objective:  Patient Vitals for the past 24 hrs:   BP Temp Temp src Pulse Resp SpO2 Weight   23 0856 -- -- -- -- -- -- 71 kg (156 lb 9.6 oz)   23 0809 124/67 97.9  F (36.6  C) Oral 76 16 -- --   23 2343 123/71 98  F (36.7  C) Oral 79 16 -- --   23 2200 120/80 -- -- 70 16 -- --   23 1737 116/70 98  F (36.7  C) Oral 74 16 96 % --   23 1417 124/74 97.9  F (36.6  C) Oral 75 -- -- --   23 1118 -- -- -- -- -- -- 71.5 kg (157 lb 11.2 oz)   23 0926 115/76 98.3  F (36.8  C) Oral 73 16 96 % --       General: NAD, appears generally well  CV:  Regular rate  Resp:  Breathing comfortably on RA  Abd:  Soft, nontender, nondistended, fundus firm at approximately 3 cm below umbilicus, incision c/d/i with overlying bandage  Ext:  2+ edema in bilateral LE    Assessment and Plan:  37 year old old  post-partum day 2 s/p RLTCS.  AFVSS. Doing well without any complaints.    gHTN  - BPs wnl.   - No signs/symptoms preE  - Baseline labs wnl. Repeat labs as clinically indicated.  - Continued inpatient monitoring of BP     GDMA2  -FBG normal, may discontinue checks.     Routine postop cares  -Hgb 13.7 > Delivery QBL (mL): 355> 11.6  -Rh positive; no rhogam needed  -Rubella immune; no MMR needed  -Breast feeding  -Plan to TX tomorrow      Anna Marie June MD, [facility]  23

## 2023-07-13 NOTE — PLAN OF CARE
VSS.  Abdominal incision UTV, dressing CDI.Denies N/V. Voiding spontaneously without difficulty - needs constant reminders to void. Breastfeeding on cue and often, encouraged to call for breastfeeding support. Pain managed with tylenol and ibuprofen. Appears to be bonding well with infant. Education completed and questions answered via  services.

## 2023-07-13 NOTE — PLAN OF CARE
Goal Outcome Evaluation:      Plan of Care Reviewed With: patient    Overall Patient Progress: improvingOverall Patient Progress: improving         Mandarin speaking jaber in room.   Vital signs stable. Postpartum assessment WDL. Incision CDI. Pain controlled with tylenol & ibuprofen remind patient to take pain medication before it is too painful. Patient ambulating independently. Patient passing gas. Breastfeeding on cue with assist. Supplementing with formula 15mL. Would benefit from lactation consult. Patient and infant bonding well. Will continue with current plan of care.

## 2023-07-13 NOTE — PLAN OF CARE
Goal Outcome Evaluation:      Plan of Care Reviewed With: patient    Overall Patient Progress: improvingOverall Patient Progress: improving     Naa is mandarin speaking,  services used for most interactions and teaching today. Naa's pain has been controlled with Ketoralac/Tylenol/abdominal binder. Voiding independently but reminding her to go to the bathroom. Fundus firm,scant flow, incision covered with a bandage, UTV, no drainage visible.    Naa's blood glucose WNL during shift [id number].    Infant breast feeding often.     scheduled for 7/13 am [id number].    Will continue plan of care.

## 2023-07-14 VITALS
TEMPERATURE: 97.8 F | HEART RATE: 79 BPM | BODY MASS INDEX: 28.58 KG/M2 | HEIGHT: 62 IN | WEIGHT: 155.3 LBS | DIASTOLIC BLOOD PRESSURE: 78 MMHG | RESPIRATION RATE: 18 BRPM | OXYGEN SATURATION: 100 % | SYSTOLIC BLOOD PRESSURE: 124 MMHG

## 2023-07-14 PROCEDURE — 250N000013 HC RX MED GY IP 250 OP 250 PS 637: Performed by: OBSTETRICS & GYNECOLOGY

## 2023-07-14 RX ADMIN — SENNOSIDES AND DOCUSATE SODIUM 1 TABLET: 50; 8.6 TABLET ORAL at 08:16

## 2023-07-14 RX ADMIN — ACETAMINOPHEN 975 MG: 325 TABLET, FILM COATED ORAL at 05:59

## 2023-07-14 RX ADMIN — ACETAMINOPHEN 975 MG: 325 TABLET, FILM COATED ORAL at 00:35

## 2023-07-14 RX ADMIN — IBUPROFEN 800 MG: 400 TABLET ORAL at 12:02

## 2023-07-14 RX ADMIN — IBUPROFEN 800 MG: 400 TABLET ORAL at 00:35

## 2023-07-14 RX ADMIN — ACETAMINOPHEN 975 MG: 325 TABLET, FILM COATED ORAL at 12:02

## 2023-07-14 RX ADMIN — IBUPROFEN 800 MG: 400 TABLET ORAL at 05:59

## 2023-07-14 ASSESSMENT — ACTIVITIES OF DAILY LIVING (ADL)
ADLS_ACUITY_SCORE: 24

## 2023-07-14 NOTE — DISCHARGE INSTRUCTIONS
Warning Signs after Having a Baby    Keep this paper on your fridge or somewhere else where you can see it.    Call your provider if you have any of these symptoms up to 12 weeks after having your baby.    Thoughts of hurting yourself or your baby  Pain in your chest or trouble breathing  Severe headache not helped by pain medicine  Eyesight concerns (blurry vision, seeing spots or flashes of light, other changes to eyesight)  Fainting, shaking or other signs of a seizure    Call 9-1-1 if you feel that it is an emergency.     The symptoms below can happen to anyone after giving birth. They can be very serious. Call your provider if you have any of these warning signs.    My provider s phone number: _______________________    Losing too much blood (hemorrhage)    Call your provider if you soak through a pad in less than an hour or pass blood clots bigger than a golf ball. These may be signs that you are bleeding too much.    Blood clots in the legs or lungs    After you give birth, your body naturally clots its blood to help prevent blood loss. Sometimes this increased clotting can happen in other areas of the body, like the legs or lungs. This can block your blood flow and be very dangerous.     Call your provider if you:  Have a red, swollen spot on the back of your leg that is warm or painful when you touch it.   Are coughing up blood.     Infection    Call your provider if you have any of these symptoms:  Fever of 100.4 F (38 C) or higher.  Pain or redness around your stitches if you had an incision.   Any yellow, white, or green fluid coming from places where you had stitches or surgery.    Mood Problems (postpartum depression)    Many people feel sad or have mood changes after having a baby. But for some people, these mood swings are worse.     Call your provider right away if you feel so anxious or nervous that you can't care for yourself or your baby.    Preeclampsia (high blood pressure)    Even if you  didn't have high blood pressure when you were pregnant, you are at risk for the high blood pressure disease called preeclampsia. This risk can last up to 12 weeks after giving birth.     Call your provider if you have:   Pain on your right side under your rib cage  Sudden swelling in the hands and face    Remember: You know your body. If something doesn't feel right, get medical help.     For informational purposes only. Not to replace the advice of your health care provider. Copyright 2020 [facility]. All rights reserved. Clinically reviewed by Enedelia Delgado, RNC-OB, MSN. [employer] [id number] - Rev 02/23.

## 2023-07-14 NOTE — PLAN OF CARE
Goal Outcome Evaluation:                  Discharge instructions reviewed with Ellen umana.    D: VSS, assessments WDL.   I: Pt. received complete discharge paperwork and home medications as filled by discharge pharmacy.  Pt. was given times of last dose for all discharge medications in writing on discharge medication sheets.  Discharge teaching included home medication, pain management, activity restrictions, postpartum cares, and signs and symptoms of infection.    A: Discharge outcomes on care plan met.  Mother states understanding and comfort with self and baby cares.  P: Pt. discharged to home.  Pt. was discharged with baby, and bands were checked at time of discharge.  Pt. was accompanied by , nursing assistant and baby, and left with personal belongings.  .  Pt. to follow up with OB per MD order.  Pt. had no further questions at the time of discharge and no unmet needs were identified.

## 2023-07-14 NOTE — PROGRESS NOTES
Post Partum Progress Note    Subjective:  Patient is doing well. Pain well controlled with meds. No complaints. Moderate lochia. Tolerating PO and ambulating without any issues. Denies any fever, chills, SOB, chest pain, N/V,  headache, dizziness. Edema improved. Planning on breast feeding.     Objective:  Patient Vitals for the past 24 hrs:   BP Temp Temp src Pulse Resp SpO2 Weight   23 0000 126/74 98.4  F (36.9  C) Oral 66 16 98 % --   23 1631 137/83 98.2  F (36.8  C) Oral 68 16 -- --   23 0856 -- -- -- -- -- -- 71 kg (156 lb 9.6 oz)   23 0809 124/67 97.9  F (36.6  C) Oral 76 16 -- --       General: NAD, appears generally well  CV:  Regular rate  Resp:  Breathing comfortably on RA  Abd:  Soft, nontender, nondistended, fundus firm at approximately 3 cm below umbilicus, incision c/d/i with overlying bandage  Ext:  trace edema in bilateral LE    Assessment and Plan:  37 year old old  post-partum day 3 s/p RLTCS.  AFVSS. Doing well without any complaints.    gHTN  - BPs wnl  - No signs/symptoms preE  - Baseline labs wnl. Repeat labs as clinically indicated.  - BP check next week, rx for BP cuff provided     GDMA2  -FBG normal, may discontinue checks.     Routine postop cares  -Hgb 13.7 > Delivery QBL (mL): 355> 11.6  -Rh positive; no rhogam needed  -Rubella immune; no MMR needed  -Breast feeding  -Plan to DC today      Anna Marie June MD, S  23

## 2023-07-14 NOTE — PLAN OF CARE
Goal Outcome Evaluation:      Plan of Care Reviewed With: patient    Overall Patient Progress: improvingOverall Patient Progress: improving    Vital signs stable. Postpartum assessment WDL. Incision CDI. Pain controlled with Ibuprofen and Tylenol. Patient ambulating IND. Patient reports passing gas. Breastfeeding on cue without assist. Patient and infant bonding well. Will continue with current plan of care.

## 2023-07-14 NOTE — PLAN OF CARE
VSS, on room air. Scheduled tylenol and ibuprofen given for pain control. Independent in the room and with breastfeeding. Incision dressing is C/D/I.

## 2023-07-16 NOTE — DISCHARGE SUMMARY
[facility]    Discharge Summary  Obstetrics    Date of Admission:  2023  Date of Discharge:  2023 12:42 PM  Discharging Provider: Marianne Peck MD    Discharge Diagnoses   Repeat  section  Gestational hypertension    Procedure/Surgery Information   Procedure: Procedure(s):  REPEAT  SECTION   Surgeon(s): Surgeon(s) and Role:     * Marianne Peck MD - Primary     History of Present Illness   Naa Yap is a 37 year old female who presented with elevated blood pressures and contractions.  She was scheduled for repeat  the following week. She met criteria for gestational hypertension, and with contractions she was in early labor. Trial of labor offered, but patient declined. Patient underwent uncomplicated repeat  section.    Hospital Course   The patient's hospital course was unremarkable.  She recovered as anticipated and experienced no post-operative complications.  On discharge, her pain was well controlled. Vaginal bleeding is similar to peak menstrual flow.  Voiding without difficulty.  Ambulating well and tolerating a normal diet.  No fever or significant wound drainage.  Breastfeeding well.  Infant is stable.  She was discharged on post-partum day #3.    Post-partum hemoglobin:   Hemoglobin   Date Value Ref Range Status   2023 11.6 (L) 11.7 - 15.7 g/dL Final       [place] Depression Scale  Thoughts of Harming Self: Never  Total Score: 2      Marianne Peck MD    Discharge Disposition   Discharged to home   Condition at discharge: Stable    Primary Care Physician   Physician No Ref-Primary    Consultations This Hospital Stay   HOSPITALIST IP CONSULT  LACTATION IP CONSULT    Discharge Orders      Postpartum Home Care Referral      Activity    Activity as tolerated     Reason for your hospital stay    Maternity care     Follow Up    Follow up with provider in 2 weeks and 6 weeks for post-delivery checks     Activity    No lifting more than 15  pounds for 6 weeks     Activity    No driving while on narcotics     Chronic hypertension or other hypertensive disorder of pregnancy follow up    Follow up in clinic or with home care within 3-5 days for blood pressure check     Discharge Instructions - Gestational diabetic patients    Gestational diabetic patients to follow up for fasting blood sugar and 2 hour 75gm glucose load at 6 weeks postpartum.     Breast Pump DME    Breast Pump Documentation:   Manual/Electric Pump: To support adequate breast milk production and nutrition for infant.     I, the undersigned, certify that the above prescribed supplies are medically necessary for this patient and is both reasonable and necessary in reference to accepted standards of medical and necessary in reference to accepted standards of medical practice in the treatment of this patient's condition and is not prescribed as a convenience.     Home Blood Pressure Cuff Order for DME    DME Documentation:   Describe the reason for need to support medical necessity: hypertension.    I, the undersigned, certify that the above prescribed supplies are medically necessary for this patient and is both reasonable and necessary in reference to accepted standards of medical and necessary in reference to accepted standards of medical practice in the treatment of this patient's condition and is not prescribed as a convenience.     Diet    Resume previous diet     Discharge Medications   Discharge Medication List as of 7/14/2023 11:46 AM      START taking these medications    Details   acetaminophen (TYLENOL) 325 MG tablet Take 3 tablets (975 mg) by mouth every 6 hours, Disp-30 tablet, R-3, E-Prescribe      ibuprofen (ADVIL/MOTRIN) 800 MG tablet Take 1 tablet (800 mg) by mouth every 6 hours, Disp-30 tablet, R-3, E-Prescribe      oxyCODONE (ROXICODONE) 5 MG tablet Take 1 tablet (5 mg) by mouth every 4 hours as needed for severe pain, Disp-5 tablet, R-0, E-Prescribe      senna-docusate  (SENOKOT-S/PERICOLACE) 8.6-50 MG tablet Take 1 tablet by mouth 2 times daily, Disp-30 tablet, R-3, E-Prescribe         CONTINUE these medications which have NOT CHANGED    Details   acetone urine (KETOSTIX) test strip 1 strip daily Use one strip daily for 1 week then weekly if negativeDisp-50 strip, [id number]      blood glucose (ACCU-CHEK GUIDE) test strip Use to test blood sugar 4 times daily or as directed., Disp-150 strip, R-3, E-Prescribe      blood glucose monitoring (SOFTCLIX) lancets 1 each 4 times daily Use to test blood sugar 4 times daily or as directed., Disp-200 each, R-3, E-Prescribe      Blood Glucose Monitoring Suppl (ACCU-CHEK GUIDE) w/Device KIT 1 kit 4 times daily, Disp-1 kit, R-1, E-PrescribeOk to change brand per insurance      famotidine (PEPCID) 20 MG tablet Take 1 tablet (20 mg) by mouth 2 times daily, Disp-90 tablet, R-3, E-Prescribe      folic acid (FOLVITE) 1 MG tablet Take 1 tablet (1 mg) by mouth daily, Disp-90 tablet, R-3, E-Prescribe      insulin pen needle (32G X 4 MM) 32G X 4 MM miscellaneous Use 1 pen needles daily or as directed.Disp-100 each, [id number]      Prenatal Vit-Fe Fumarate-FA (PRENATAL MULTIVITAMIN W/IRON) 27-0.8 MG tablet Take 1 tablet by mouth daily, Disp-90 tablet, R-3, E-Prescribe         STOP taking these medications       Aspirin 81 MG CAPS Comments:   Reason for Stopping:         insulin NPH (HUMULIN N KWIKPEN) 100 UNIT/ML injection Comments:   Reason for Stopping:             Allergies   No Known Allergies

## 2023-07-17 ENCOUNTER — ALLIED HEALTH/NURSE VISIT (OUTPATIENT)
Dept: OBGYN | Facility: CLINIC | Age: 38
End: 2023-07-17
Payer: COMMERCIAL

## 2023-07-17 VITALS — DIASTOLIC BLOOD PRESSURE: 70 MMHG | HEART RATE: 77 BPM | SYSTOLIC BLOOD PRESSURE: 111 MMHG

## 2023-07-17 DIAGNOSIS — O13.9 GESTATIONAL HYPERTENSION: Primary | ICD-10-CM

## 2023-07-17 PROCEDURE — 99211 OFF/OP EST MAY X REQ PHY/QHP: CPT | Mod: 24

## 2023-07-17 NOTE — PROGRESS NOTES
Repeat C/S 7/11/23  Hx:gestational hypertension  GDM    Patient here for blood pressure check per    Currently on no blood pressure medications   Was told to monitor blood pressure post C/S.  Chronic hypertension or other hypertensive disorder of pregnancy follow up     Follow up in clinic or with home care within 3-5 days for blood pressure check     /70 (BP Location: Left arm, Patient Position: Sitting, Cuff Size: Adult Regular)   Pulse 77   LMP 10/18/2022 (Exact Date)       Denies chest pain or pressure. Denies palpitations. Denies shortness of breath or breathing problems. Denies lightheadedness,dizziness. Denies headache/vision changes. Denies groin/calf/leg pain. Edema 1+. Denies increase swelling of extremities. Normal reflexes.   Incision was clean,dry and intact.     Discussed blood pressure is in range in clinic today. Keep postpartum visits as scheduled. Ok to remove steri strips. Discussed steri-strip removal with patient, removing slowly after taking shower when skin is wet. Can use wash cloth to help guide the steri strips off. Pull slowly away and monitoring incision.   Will CC:  and contact patient with further recommendations if any updates.    Yesenia Mattson RN on 7/17/2023 at 11:01 AM

## 2023-07-20 ENCOUNTER — ANESTHESIA (OUTPATIENT)
Dept: OBGYN | Facility: CLINIC | Age: 38
End: 2023-07-20
Payer: COMMERCIAL

## 2023-08-21 ENCOUNTER — TELEPHONE (OUTPATIENT)
Dept: OBGYN | Facility: CLINIC | Age: 38
End: 2023-08-21
Payer: COMMERCIAL

## 2023-08-21 NOTE — TELEPHONE ENCOUNTER
Reason for call:  Symptom   Symptom or request: Patient is having heavy bleeding and is calling today to ask a nurse if this is normal after her  on ?    Duration (how long have symptoms been present): 40 days  Have you been treated for this before? No    Additional comments: none    Phone number to reach patient:  Cell number on file:    Telephone Information:   Mobile [phone]       Best Time:  ASAP- with a Mandarin     Can we leave a detailed message on this number?  Not Applicable    Travel screening: Not Applicable

## 2023-08-21 NOTE — TELEPHONE ENCOUNTER
Returning pt call   ID #[id number]    7/11/23: Delivered C/S  Still noticing vaginal bleeding, still noticing some brown bleeding and discharge. States she is currently using two pads per day.  Discussed normal postpartum bleeding and discharge   States she is also noticing some bleeding from the left side of her incision. States this happened one day and has not noticing any more bleeding or discharge since.  Does note some increase in incisional pain if not holding the incision for additional support.    Recommend continued tylenol and ibuprofen for pain  Also recommend abdominal binder to provide additional support and comfort to her incision  Pt verbalized understanding, in agreement with plan, and voiced no further questions.  Kari Celeste RN on 8/21/2023 at 12:23 PM

## 2023-08-25 NOTE — PROGRESS NOTES
"[facility]  OB/GYN Clinic Note    Seen with iPad Mandarin      SUBJECTIVE:  Naa Yap,  is here for a postpartum visit.  She had a repeat  Section  on 23 delivering a healthy baby boy, named Jassi Olivier weighing 6 lbs 11.2 oz at term, 38 weeks.    HPI:  S/p CS at 38w due to early labor and gHTN. Recovering well. Mood is good. No cycle yet. Baby doing well.   gHTN- BPs normal.   GDMA2 - normal fasting BG postpartum.     Delivery complications:  No  Breast feeding:  Yes  Bladder problems:  No  Bowel problems/hemorrhoids:  No  Episiotomy/laceration/incision:  incision   Vaginal flow:  had been spotting up until 2 days ago  Contraception: unsure  Emotional adjustment:  doing well and happy  Back to work:  staying at home      OBJECTIVE:  Vitals: /70   Ht 1.575 m (5' 2\")   Wt 64.4 kg (142 lb)   LMP 10/18/2022 (Exact Date)   Breastfeeding Yes   BMI 25.97 kg/m    BMI= Body mass index is 25.97 kg/m .  General - pleasant female in no acute distress.  Abdomen - Incision well-healed  Pelvic - Cervix: no lesions or CMT    ASSESSMENT:    ICD-10-CM    1. S/P repeat low transverse   Z98.891       2. Gestational hypertension, third trimester  O13.3       3. Insulin controlled gestational diabetes mellitus (GDM) in third trimester  O24.414 Gestational glucose tolerance testing (GTT), 2 hour      4. Family planning education, guidance, and counseling  Z30.       5. General counseling and advice on contraceptive management  Z30.09       6. Pap smear for cervical cancer screening  Z12.4 Pap imaged thin layer screen with HPV - recommended age 30 - 65 years (select HPV order below)      7. Screening and evaluation for female sterilization  Z30.09         PLAN:  May resume normal activities without restrictions.  Pap smear was done today.  Full counseling was provided, and all questions were answered.   Family planning- not considering more babies. Not " interested in hormonal contraception. Considering vasectomy/tubal. Discussed procedures in detail. Follow-up if desiring to schedule salpingectomy.   gHTN- recommend establishing care with PCP for BP screening/preventative care.   GDMA2- fasting 2 hour GTT ordered.     Anna Marie June MD, S  08/28/23

## 2023-08-28 ENCOUNTER — PRENATAL OFFICE VISIT (OUTPATIENT)
Dept: OBGYN | Facility: CLINIC | Age: 38
End: 2023-08-28
Payer: COMMERCIAL

## 2023-08-28 VITALS
SYSTOLIC BLOOD PRESSURE: 102 MMHG | HEIGHT: 62 IN | DIASTOLIC BLOOD PRESSURE: 70 MMHG | WEIGHT: 142 LBS | BODY MASS INDEX: 26.13 KG/M2

## 2023-08-28 DIAGNOSIS — Z30.09 FAMILY PLANNING EDUCATION, GUIDANCE, AND COUNSELING: ICD-10-CM

## 2023-08-28 DIAGNOSIS — Z30.09 GENERAL COUNSELING AND ADVICE ON CONTRACEPTIVE MANAGEMENT: ICD-10-CM

## 2023-08-28 DIAGNOSIS — Z30.09 SCREENING AND EVALUATION FOR FEMALE STERILIZATION: ICD-10-CM

## 2023-08-28 DIAGNOSIS — Z98.891 S/P REPEAT LOW TRANSVERSE C-SECTION: Primary | ICD-10-CM

## 2023-08-28 DIAGNOSIS — O13.3 GESTATIONAL HYPERTENSION, THIRD TRIMESTER: ICD-10-CM

## 2023-08-28 DIAGNOSIS — O24.414 INSULIN CONTROLLED GESTATIONAL DIABETES MELLITUS (GDM) IN THIRD TRIMESTER: ICD-10-CM

## 2023-08-28 DIAGNOSIS — Z12.4 PAP SMEAR FOR CERVICAL CANCER SCREENING: ICD-10-CM

## 2023-08-28 PROCEDURE — 87624 HPV HI-RISK TYP POOLED RSLT: CPT | Performed by: STUDENT IN AN ORGANIZED HEALTH CARE EDUCATION/TRAINING PROGRAM

## 2023-08-28 PROCEDURE — 99024 POSTOP FOLLOW-UP VISIT: CPT | Performed by: STUDENT IN AN ORGANIZED HEALTH CARE EDUCATION/TRAINING PROGRAM

## 2023-08-28 PROCEDURE — G0145 SCR C/V CYTO,THINLAYER,RESCR: HCPCS | Performed by: STUDENT IN AN ORGANIZED HEALTH CARE EDUCATION/TRAINING PROGRAM

## 2023-08-28 ASSESSMENT — ANXIETY QUESTIONNAIRES
GAD7 TOTAL SCORE: 1
5. BEING SO RESTLESS THAT IT IS HARD TO SIT STILL: NOT AT ALL
1. FEELING NERVOUS, ANXIOUS, OR ON EDGE: NOT AT ALL
IF YOU CHECKED OFF ANY PROBLEMS ON THIS QUESTIONNAIRE, HOW DIFFICULT HAVE THESE PROBLEMS MADE IT FOR YOU TO DO YOUR WORK, TAKE CARE OF THINGS AT HOME, OR GET ALONG WITH OTHER PEOPLE: NOT DIFFICULT AT ALL
GAD7 TOTAL SCORE: 1
6. BECOMING EASILY ANNOYED OR IRRITABLE: SEVERAL DAYS
7. FEELING AFRAID AS IF SOMETHING AWFUL MIGHT HAPPEN: NOT AT ALL
2. NOT BEING ABLE TO STOP OR CONTROL WORRYING: NOT AT ALL
3. WORRYING TOO MUCH ABOUT DIFFERENT THINGS: NOT AT ALL

## 2023-08-28 ASSESSMENT — PATIENT HEALTH QUESTIONNAIRE - PHQ9
SUM OF ALL RESPONSES TO PHQ QUESTIONS 1-9: 0
5. POOR APPETITE OR OVEREATING: NOT AT ALL

## 2023-08-28 NOTE — PATIENT INSTRUCTIONS
Please establish care with  primary care physician    Please return for a fasting diabetes test

## 2023-08-30 LAB
BKR LAB AP GYN ADEQUACY: NORMAL
BKR LAB AP GYN INTERPRETATION: NORMAL
BKR LAB AP HPV REFLEX: NORMAL
BKR LAB AP PREVIOUS ABNORMAL: NORMAL
PATH REPORT.COMMENTS IMP SPEC: NORMAL
PATH REPORT.COMMENTS IMP SPEC: NORMAL
PATH REPORT.RELEVANT HX SPEC: NORMAL

## 2023-09-01 LAB
HUMAN PAPILLOMA VIRUS 16 DNA: NEGATIVE
HUMAN PAPILLOMA VIRUS 18 DNA: NEGATIVE
HUMAN PAPILLOMA VIRUS FINAL DIAGNOSIS: NORMAL
HUMAN PAPILLOMA VIRUS OTHER HR: NEGATIVE

## 2024-03-10 ENCOUNTER — HEALTH MAINTENANCE LETTER (OUTPATIENT)
Age: 39
End: 2024-03-10

## 2024-12-15 ENCOUNTER — HEALTH MAINTENANCE LETTER (OUTPATIENT)
Age: 39
End: 2024-12-15

## 2025-03-16 ENCOUNTER — HEALTH MAINTENANCE LETTER (OUTPATIENT)
Age: 40
End: 2025-03-16

## 2025-03-20 ENCOUNTER — APPOINTMENT (OUTPATIENT)
Dept: ULTRASOUND IMAGING | Facility: CLINIC | Age: 40
End: 2025-03-20
Attending: EMERGENCY MEDICINE
Payer: COMMERCIAL

## 2025-03-20 ENCOUNTER — HOSPITAL ENCOUNTER (EMERGENCY)
Facility: CLINIC | Age: 40
Discharge: HOME | End: 2025-03-20
Attending: EMERGENCY MEDICINE
Payer: COMMERCIAL

## 2025-03-20 VITALS
TEMPERATURE: 98.6 F | HEART RATE: 77 BPM | SYSTOLIC BLOOD PRESSURE: 134 MMHG | RESPIRATION RATE: 16 BRPM | OXYGEN SATURATION: 98 % | DIASTOLIC BLOOD PRESSURE: 91 MMHG

## 2025-03-20 DIAGNOSIS — R10.13 EPIGASTRIC PAIN: ICD-10-CM

## 2025-03-20 DIAGNOSIS — K80.50 BILIARY COLIC: ICD-10-CM

## 2025-03-20 LAB
ALBUMIN SERPL BCG-MCNC: 4.3 G/DL (ref 3.5–5.2)
ALP SERPL-CCNC: 64 U/L (ref 40–150)
ALT SERPL W P-5'-P-CCNC: 14 U/L (ref 0–50)
ANION GAP SERPL CALCULATED.3IONS-SCNC: 10 MMOL/L (ref 7–15)
AST SERPL W P-5'-P-CCNC: 13 U/L (ref 0–45)
ATRIAL RATE - MUSE: 71 BPM
BASOPHILS # BLD AUTO: 0 10E3/UL (ref 0–0.2)
BASOPHILS NFR BLD AUTO: 0 %
BILIRUB SERPL-MCNC: 0.2 MG/DL
BUN SERPL-MCNC: 12.1 MG/DL (ref 6–20)
CALCIUM SERPL-MCNC: 8.8 MG/DL (ref 8.8–10.4)
CHLORIDE SERPL-SCNC: 108 MMOL/L (ref 98–107)
CREAT SERPL-MCNC: 0.71 MG/DL (ref 0.51–0.95)
DIASTOLIC BLOOD PRESSURE - MUSE: NORMAL MMHG
EGFRCR SERPLBLD CKD-EPI 2021: >90 ML/MIN/1.73M2
EOSINOPHIL # BLD AUTO: 0.1 10E3/UL (ref 0–0.7)
EOSINOPHIL NFR BLD AUTO: 2 %
ERYTHROCYTE [DISTWIDTH] IN BLOOD BY AUTOMATED COUNT: 12 % (ref 10–15)
GLUCOSE SERPL-MCNC: 119 MG/DL (ref 70–99)
HCG SERPL QL: NEGATIVE
HCO3 SERPL-SCNC: 24 MMOL/L (ref 22–29)
HCT VFR BLD AUTO: 35.7 % (ref 35–47)
HGB BLD-MCNC: 12.1 G/DL (ref 11.7–15.7)
HOLD SPECIMEN: NORMAL
IMM GRANULOCYTES # BLD: 0 10E3/UL
IMM GRANULOCYTES NFR BLD: 0 %
INTERPRETATION ECG - MUSE: NORMAL
LIPASE SERPL-CCNC: 40 U/L (ref 13–60)
LYMPHOCYTES # BLD AUTO: 1.7 10E3/UL (ref 0.8–5.3)
LYMPHOCYTES NFR BLD AUTO: 28 %
MCH RBC QN AUTO: 27.6 PG (ref 26.5–33)
MCHC RBC AUTO-ENTMCNC: 33.9 G/DL (ref 31.5–36.5)
MCV RBC AUTO: 81 FL (ref 78–100)
MONOCYTES # BLD AUTO: 0.4 10E3/UL (ref 0–1.3)
MONOCYTES NFR BLD AUTO: 6 %
NEUTROPHILS # BLD AUTO: 3.9 10E3/UL (ref 1.6–8.3)
NEUTROPHILS NFR BLD AUTO: 64 %
NRBC # BLD AUTO: 0 10E3/UL
NRBC BLD AUTO-RTO: 0 /100
P AXIS - MUSE: 41 DEGREES
PLATELET # BLD AUTO: 193 10E3/UL (ref 150–450)
POTASSIUM SERPL-SCNC: 3.9 MMOL/L (ref 3.4–5.3)
PR INTERVAL - MUSE: 142 MS
PROT SERPL-MCNC: 7.2 G/DL (ref 6.4–8.3)
QRS DURATION - MUSE: 88 MS
QT - MUSE: 402 MS
QTC - MUSE: 436 MS
R AXIS - MUSE: 72 DEGREES
RBC # BLD AUTO: 4.39 10E6/UL (ref 3.8–5.2)
SODIUM SERPL-SCNC: 142 MMOL/L (ref 135–145)
SYSTOLIC BLOOD PRESSURE - MUSE: NORMAL MMHG
T AXIS - MUSE: 17 DEGREES
VENTRICULAR RATE- MUSE: 71 BPM
WBC # BLD AUTO: 6.2 10E3/UL (ref 4–11)

## 2025-03-20 PROCEDURE — 84703 CHORIONIC GONADOTROPIN ASSAY: CPT | Performed by: EMERGENCY MEDICINE

## 2025-03-20 PROCEDURE — 99285 EMERGENCY DEPT VISIT HI MDM: CPT | Mod: 25

## 2025-03-20 PROCEDURE — 76705 ECHO EXAM OF ABDOMEN: CPT

## 2025-03-20 PROCEDURE — 36415 COLL VENOUS BLD VENIPUNCTURE: CPT | Performed by: EMERGENCY MEDICINE

## 2025-03-20 PROCEDURE — 93005 ELECTROCARDIOGRAM TRACING: CPT

## 2025-03-20 PROCEDURE — 80053 COMPREHEN METABOLIC PANEL: CPT | Performed by: EMERGENCY MEDICINE

## 2025-03-20 PROCEDURE — 83690 ASSAY OF LIPASE: CPT | Performed by: EMERGENCY MEDICINE

## 2025-03-20 PROCEDURE — 85025 COMPLETE CBC W/AUTO DIFF WBC: CPT | Performed by: EMERGENCY MEDICINE

## 2025-03-20 RX ORDER — ACETAMINOPHEN 500 MG
500-1000 TABLET ORAL EVERY 6 HOURS PRN
Qty: 60 TABLET | Refills: 0 | Status: SHIPPED | OUTPATIENT
Start: 2025-03-20 | End: 2025-03-20

## 2025-03-20 RX ORDER — IBUPROFEN 200 MG
400 TABLET ORAL EVERY 6 HOURS PRN
Qty: 60 TABLET | Refills: 0 | Status: SHIPPED | OUTPATIENT
Start: 2025-03-20 | End: 2025-03-20

## 2025-03-20 RX ORDER — OXYCODONE HYDROCHLORIDE 5 MG/1
5 TABLET ORAL EVERY 6 HOURS PRN
Qty: 10 TABLET | Refills: 0 | Status: SHIPPED | OUTPATIENT
Start: 2025-03-20 | End: 2025-03-20

## 2025-03-20 RX ORDER — ACETAMINOPHEN 500 MG
500-1000 TABLET ORAL EVERY 6 HOURS PRN
Qty: 60 TABLET | Refills: 0 | Status: SHIPPED | OUTPATIENT
Start: 2025-03-20

## 2025-03-20 RX ORDER — IBUPROFEN 200 MG
400 TABLET ORAL EVERY 6 HOURS PRN
Qty: 60 TABLET | Refills: 0 | Status: SHIPPED | OUTPATIENT
Start: 2025-03-20

## 2025-03-20 RX ORDER — OXYCODONE HYDROCHLORIDE 5 MG/1
5 TABLET ORAL EVERY 6 HOURS PRN
Qty: 10 TABLET | Refills: 0 | Status: SHIPPED | OUTPATIENT
Start: 2025-03-20

## 2025-03-20 ASSESSMENT — ACTIVITIES OF DAILY LIVING (ADL)
ADLS_ACUITY_SCORE: 48

## 2025-03-20 ASSESSMENT — COLUMBIA-SUICIDE SEVERITY RATING SCALE - C-SSRS
1. IN THE PAST MONTH, HAVE YOU WISHED YOU WERE DEAD OR WISHED YOU COULD GO TO SLEEP AND NOT WAKE UP?: NO
6. HAVE YOU EVER DONE ANYTHING, STARTED TO DO ANYTHING, OR PREPARED TO DO ANYTHING TO END YOUR LIFE?: NO
2. HAVE YOU ACTUALLY HAD ANY THOUGHTS OF KILLING YOURSELF IN THE PAST MONTH?: NO

## 2025-03-20 NOTE — ED PROVIDER NOTES
Emergency Department Note      History of Present Illness     Chief Complaint   Abdominal Pain    History provided by the patient, translated from Mandarin by a professional interpretor.    LANA Jacome Ma is a 39 year old female who presents to the ED for abdominal pain. The patient reports developing upper abdominal pain yesterday at 2100. She describes this as a big rock stuck in her stomach and rates it an 8/10 in severity. Patient had eaten 2 hours prior to the onset of her pain. She took 2 tylenol, but this did not help. 30 minutes later she took another 3 tylenol, which did not help either. Patient then took 1 ibuprofen at 0200, which did help reduce her pain. She last took ibuprofen at 0900 today, but notes that she still has some discomfort. Her current pain is a 2-3/10 in severity. She called her primary who advised her to be seen in the ED. Patient states that she first experienced this type of pain 2 days ago and adds that it woke her from sleep. Patient has no chronic health issues. She did have gestational diabetes and hypertension, though these issues were resolved after giving birth. She is now breastfeeding. No chance pregnant. She still has her gallbladder. Of note, patient has minimal alcohol consumption and does not excessively use NSAIDS.    Independent Historian   None    Review of External Notes   None    Past Medical History     Medical History and Problem List   Supervision of high-risk pregnancy  Pyelectasis of fetus on prenatal ultrasound   section complicating pregnancy  Insulin controlled gestational diabetes mellitus in third trimester  Advanced maternal age multigravida 35+, third trimester  Gestational hypertension  Pre-eclampsia     Medications   Prenatal multivitamin     Surgical History   Past Surgical History:   Procedure Laterality Date     SECTION       SECTION N/A 2023    Procedure: REPEAT  SECTION;  Surgeon: Marianne Peck MD;  Location:   L+D       Physical Exam     Patient Vitals for the past 24 hrs:   BP Temp Pulse Resp SpO2   03/20/25 1359 (!) 134/91 98.6  F (37  C) 77 16 98 %     Physical Exam  General: Alert and cooperative with exam. Patient in mild distress. Normal mentation.  Head:  Scalp is NC/AT  Eyes:  No scleral icterus, PERRL  ENT:  The external nose and ears are normal. The oropharynx is normal and without erythema; mucus membranes are moist. Uvula midline, no evidence of deep space infection.  Neck:  Normal range of motion without rigidity.  CV:  Regular rate and rhythm    No pathologic murmur   Resp:  Breath sounds are clear bilaterally    Non-labored, no retractions or accessory muscle use  GI:  Abdomen is soft, no distension, minimal epigastric tenderness. No peritoneal signs  MS:  No lower extremity edema   Skin:  Warm and dry, No rash or lesions noted.  Neuro: Oriented x 3. No gross motor deficits.      Diagnostics     Lab Results   Labs Ordered and Resulted from Time of ED Arrival to Time of ED Departure   COMPREHENSIVE METABOLIC PANEL - Abnormal       Result Value    Sodium 142      Potassium 3.9      Carbon Dioxide (CO2) 24      Anion Gap 10      Urea Nitrogen 12.1      Creatinine 0.71      GFR Estimate >90      Calcium 8.8      Chloride 108 (*)     Glucose 119 (*)     Alkaline Phosphatase 64      AST 13      ALT 14      Protein Total 7.2      Albumin 4.3      Bilirubin Total 0.2     LIPASE - Normal    Lipase 40     HCG QUALITATIVE PREGNANCY - Normal    hCG Serum Qualitative Negative     CBC WITH PLATELETS AND DIFFERENTIAL    WBC Count 6.2      RBC Count 4.39      Hemoglobin 12.1      Hematocrit 35.7      MCV 81      MCH 27.6      MCHC 33.9      RDW 12.0      Platelet Count 193      % Neutrophils 64      % Lymphocytes 28      % Monocytes 6      % Eosinophils 2      % Basophils 0      % Immature Granulocytes 0      NRBCs per 100 WBC 0      Absolute Neutrophils 3.9      Absolute Lymphocytes 1.7      Absolute Monocytes 0.4       Absolute Eosinophils 0.1      Absolute Basophils 0.0      Absolute Immature Granulocytes 0.0      Absolute NRBCs 0.0         Imaging   US Abdomen Limited   Final Result   IMPRESSION:      1. There is a 2.4 cm nonmobile gallstone in the neck/body. Mild wall thickening is probably due to decompressed gallbladder (patient ate at 12:30 PM). No significant pericholecystic fluid and negative Strange's sign.      2. Mild biliary ductal dilatation.          EKG   ECG results from 03/20/25   EKG 12-lead, tracing only     Value    Systolic Blood Pressure     Diastolic Blood Pressure     Ventricular Rate 71    Atrial Rate 71    OR Interval 142    QRS Duration 88        QTc 436    P Axis 41    R AXIS 72    T Axis 17    Interpretation ECG      Sinus rhythm with sinus arrhythmia  Cannot rule out Anterior infarct , age undetermined  Abnormal ECG  No previous ECGs available  Confirmed by GENERATED REPORT, COMPUTER ([id number]),  Stephany Sun ([id number]) on 3/20/2025 4:05:29 PM       Independent Interpretation   None    ED Course      Medications Administered   Medications - No data to display    Procedures   Procedures     Discussion of Management   None    ED Course   ED Course as of 03/20/25 1648   Thu Mar 20, 2025   1525 I obtained history and examined the patient as noted above.         Additional Documentation  None    Medical Decision Making / Diagnosis     CMS Diagnoses: None    MIPS       None    MDM   Naa Yap is a 39 year old female who presents with symptoms consistent with gallstones and biliary colic.  Ultrasound has confirmed the presence of gallstones.  There is no clinical, laboratory, or ultrasound evidence of Choledocholithiasis, Gallstone Pancreatitis, or Ascending Cholangitis.  I doubt GERD, gastritis, PUD, perforated ulcer, diverticulitis, colitis.  The patient will be prescribed analgesics. The patient was educated to avoid fatty foods.  The patient was told to return to ED for increasing pain, vomiting,  chills, sweats, or fever.  Follow up with general surgery is indicated for outpatient consultation, this may be arranged as soon as able.      Disposition   The patient was discharged.     Diagnosis   No diagnosis found.     Discharge Medications   New Prescriptions    No medications on file         Scribe Disclosure:  I, Rona Love, am serving as a scribe at 3:27 PM on 3/20/2025 to document services personally performed by Jose David Rios,  based on my observations and the provider's statements to me.

## 2025-03-20 NOTE — ED TRIAGE NOTES
Pt arrives via triage presenting with abdominal pain. PT had episode of sharp upper abdomen on Tuesday, pain went away with Tylenol. Last night at 2100, pain came back not relieved with medications. Denies N/V, diarrhea or constipation.      Triage Assessment (Adult)       Row Name 03/20/25 0560          Triage Assessment    Airway WDL WDL        Respiratory WDL    Respiratory WDL WDL        Cardiac WDL    Cardiac WDL WDL        Peripheral/Neurovascular WDL    Peripheral Neurovascular WDL WDL        Cognitive/Neuro/Behavioral WDL    Cognitive/Neuro/Behavioral WDL WDL

## 2025-03-20 NOTE — DISCHARGE INSTRUCTIONS
Return to the emergency department or seek medical care as instructed if your symptoms fail to improve or significantly worsen.    Take Acetaminophen (aka Tylenol) and/or ibuprofen (aka Motrin/Advil, 400mg up to 4 times per day with food) as needed for symptom/pain relief; use as directed.    Oxycodone as needed for breakthrough pain    Follow-up as indicated on page 1.  Maintain adequate hydration and get plenty of rest.

## 2025-03-31 ENCOUNTER — OFFICE VISIT (OUTPATIENT)
Dept: SURGERY | Facility: CLINIC | Age: 40
End: 2025-03-31
Payer: COMMERCIAL

## 2025-03-31 ENCOUNTER — TELEPHONE (OUTPATIENT)
Dept: SURGERY | Facility: CLINIC | Age: 40
End: 2025-03-31

## 2025-03-31 VITALS
BODY MASS INDEX: 24.62 KG/M2 | HEIGHT: 62 IN | DIASTOLIC BLOOD PRESSURE: 58 MMHG | SYSTOLIC BLOOD PRESSURE: 102 MMHG | WEIGHT: 133.8 LBS | OXYGEN SATURATION: 95 % | HEART RATE: 75 BPM

## 2025-03-31 DIAGNOSIS — K80.20 CALCULUS OF GALLBLADDER WITHOUT CHOLECYSTITIS WITHOUT OBSTRUCTION: Primary | ICD-10-CM

## 2025-03-31 PROCEDURE — 3074F SYST BP LT 130 MM HG: CPT | Performed by: SURGERY

## 2025-03-31 PROCEDURE — 3078F DIAST BP <80 MM HG: CPT | Performed by: SURGERY

## 2025-03-31 PROCEDURE — 99244 OFF/OP CNSLTJ NEW/EST MOD 40: CPT | Performed by: SURGERY

## 2025-03-31 NOTE — PROGRESS NOTES
"[facility]  Surgery Consultation    PCP:  No Ref-Primary, Physician None  Consultation requested by: Jose David Rios MD    HPI: Patient is a 39-year-old female referred by the above ER provider for consultation regarding biliary colic and symptomatic gallstones.  She was recently in the emergency department due to abdominal pain.  She states that this was her third episode of epigastric and right upper quadrant abdominal pain.  This is associated with some nausea but no vomiting.  She was evaluated and found to have a 2.4 cm gallstone without evidence of cholecystitis.  She has been asymptomatic since the time of discharge.  Denies fevers or chills.    PMH:   has a past medical history of Gestational diabetes, Known health problems: none, and Pre-eclampsia.  PSH:    has a past surgical history that includes  section and  section (N/A, 2023).  Social History:   reports that she has never smoked. She has never used smokeless tobacco. She reports that she does not currently use alcohol. She reports that she does not use drugs.  Family History:  family history is not on file.  Medications/Allergies: Home medications and allergies reviewed.    ROS:  The 10 point Review of Systems is negative other than noted in the HPI.    Physical Exam:  /58   Pulse 75   Ht 1.58 m (5' 2.21\")   Wt 60.7 kg (133 lb 12.8 oz)   SpO2 95%   BMI 24.31 kg/m    GENERAL: Generally appears well.  Psych: Alert and Oriented.  Normal affect  Eyes: Sclera clear  Respiratory:  Lungs clear to ausculation bilaterally with good air excursion  Cardiovascular:  Regular Rate and Rhythm with no murmurs gallops or rubs, normal peripheral pulses  GI: Abdomen Non Distended Soft Non-Tender  No hernias palpated.  Lymphatic/Hematologic/Immune:  No femoral or cervical lymphadenopathy.  Integumentary:  No rashes  Neurological: grossly intact    Ultrasound shows Cholelithiasis No gall bladder wall thicking  No " pericholecystic fluid   All new lab and imaging data was reviewed.     Impression and Plan:  Patient is a 39 year old female with symptomatic gallstones.    PLAN:   I discussed the pathophysiology of gallbladder disease and complications of cholecystitis and choledocholithiasis with the patient.  Recommend laparoscopic cholecystectomy at her convenience I also discussed the risks associated with the procedure including, but not limited to infection, bleeding, conversion to open, bile leak, bile duct injury, retained gallstones, pneumonia, MI, and anesthesia complications with the patient.  I also discussed if a complication did occur it may require further surgical intervention during or after the procedure. The patient indicated understanding of the discussion, asked appropriate questions, and provided consent. I provided the patient an information pamphlet. I have recommended a low fat diet and instructed the patient to go to ER if they developed persistent pain, persistent nausea and vomiting, or yellowness of skin.      Thank you very much for this consult.    Edgar Contreras M.D.  [facility]  [phone]    Please route or send letter to:  Primary Care Provider (PCP) and Referring Provider

## 2025-03-31 NOTE — TELEPHONE ENCOUNTER
Type of surgery: Lap matt  Location of surgery: [facility]  Date and time of surgery: 4/8/25 at 10:30am  Surgeon: Dr. Edgar Contreras  Pre-Op Appt Date: patient to schedule  Post-Op Appt Date: patient to schedule   Packet sent out: Yes  Pre-cert/Authorization completed:  Not Applicable  Date: 3/31/25

## 2025-04-08 ENCOUNTER — HOSPITAL ENCOUNTER (OUTPATIENT)
Facility: CLINIC | Age: 40
Discharge: HOME | End: 2025-04-08
Attending: SURGERY | Admitting: SURGERY
Payer: COMMERCIAL

## 2025-04-08 ENCOUNTER — ANESTHESIA (OUTPATIENT)
Dept: SURGERY | Facility: CLINIC | Age: 40
End: 2025-04-08
Payer: COMMERCIAL

## 2025-04-08 ENCOUNTER — ANESTHESIA EVENT (OUTPATIENT)
Dept: SURGERY | Facility: CLINIC | Age: 40
End: 2025-04-08
Payer: COMMERCIAL

## 2025-04-08 VITALS
WEIGHT: 133 LBS | TEMPERATURE: 97 F | HEART RATE: 69 BPM | BODY MASS INDEX: 23.57 KG/M2 | DIASTOLIC BLOOD PRESSURE: 72 MMHG | RESPIRATION RATE: 16 BRPM | OXYGEN SATURATION: 99 % | HEIGHT: 63 IN | SYSTOLIC BLOOD PRESSURE: 122 MMHG

## 2025-04-08 DIAGNOSIS — K80.20 CHOLELITHIASIS WITHOUT CHOLECYSTITIS: Primary | ICD-10-CM

## 2025-04-08 LAB — HCG UR QL: NEGATIVE

## 2025-04-08 PROCEDURE — 258N000003 HC RX IP 258 OP 636: Performed by: ANESTHESIOLOGY

## 2025-04-08 PROCEDURE — 250N000009 HC RX 250: Performed by: NURSE ANESTHETIST, CERTIFIED REGISTERED

## 2025-04-08 PROCEDURE — 250N000013 HC RX MED GY IP 250 OP 250 PS 637: Performed by: PHYSICIAN ASSISTANT

## 2025-04-08 PROCEDURE — 370N000017 HC ANESTHESIA TECHNICAL FEE, PER MIN: Performed by: SURGERY

## 2025-04-08 PROCEDURE — 250N000011 HC RX IP 250 OP 636: Performed by: SURGERY

## 2025-04-08 PROCEDURE — 88304 TISSUE EXAM BY PATHOLOGIST: CPT | Mod: TC | Performed by: SURGERY

## 2025-04-08 PROCEDURE — 360N000076 HC SURGERY LEVEL 3, PER MIN: Performed by: SURGERY

## 2025-04-08 PROCEDURE — 88304 TISSUE EXAM BY PATHOLOGIST: CPT | Mod: 26 | Performed by: PATHOLOGY

## 2025-04-08 PROCEDURE — 47562 LAPAROSCOPIC CHOLECYSTECTOMY: CPT | Performed by: SURGERY

## 2025-04-08 PROCEDURE — 250N000025 HC SEVOFLURANE, PER MIN: Performed by: SURGERY

## 2025-04-08 PROCEDURE — 272N000001 HC OR GENERAL SUPPLY STERILE: Performed by: SURGERY

## 2025-04-08 PROCEDURE — 47562 LAPAROSCOPIC CHOLECYSTECTOMY: CPT | Mod: AS | Performed by: PHYSICIAN ASSISTANT

## 2025-04-08 PROCEDURE — 250N000009 HC RX 250: Performed by: SURGERY

## 2025-04-08 PROCEDURE — 710N000009 HC RECOVERY PHASE 1, LEVEL 1, PER MIN: Performed by: SURGERY

## 2025-04-08 PROCEDURE — 710N000012 HC RECOVERY PHASE 2, PER MINUTE: Performed by: SURGERY

## 2025-04-08 PROCEDURE — 999N000141 HC STATISTIC PRE-PROCEDURE NURSING ASSESSMENT: Performed by: SURGERY

## 2025-04-08 PROCEDURE — 250N000011 HC RX IP 250 OP 636: Performed by: NURSE ANESTHETIST, CERTIFIED REGISTERED

## 2025-04-08 PROCEDURE — 81025 URINE PREGNANCY TEST: CPT | Performed by: ANESTHESIOLOGY

## 2025-04-08 RX ORDER — HYDROMORPHONE HCL IN WATER/PF 6 MG/30 ML
0.2 PATIENT CONTROLLED ANALGESIA SYRINGE INTRAVENOUS EVERY 5 MIN PRN
Status: DISCONTINUED | OUTPATIENT
Start: 2025-04-08 | End: 2025-04-08 | Stop reason: HOSPADM

## 2025-04-08 RX ORDER — BUPIVACAINE HYDROCHLORIDE AND EPINEPHRINE 5; 5 MG/ML; UG/ML
INJECTION, SOLUTION PERINEURAL PRN
Status: DISCONTINUED | OUTPATIENT
Start: 2025-04-08 | End: 2025-04-08 | Stop reason: HOSPADM

## 2025-04-08 RX ORDER — FENTANYL CITRATE 50 UG/ML
50 INJECTION, SOLUTION INTRAMUSCULAR; INTRAVENOUS EVERY 5 MIN PRN
Status: DISCONTINUED | OUTPATIENT
Start: 2025-04-08 | End: 2025-04-08 | Stop reason: HOSPADM

## 2025-04-08 RX ORDER — FENTANYL CITRATE 50 UG/ML
25 INJECTION, SOLUTION INTRAMUSCULAR; INTRAVENOUS EVERY 5 MIN PRN
Status: DISCONTINUED | OUTPATIENT
Start: 2025-04-08 | End: 2025-04-08 | Stop reason: HOSPADM

## 2025-04-08 RX ORDER — DEXAMETHASONE SODIUM PHOSPHATE 4 MG/ML
4 INJECTION, SOLUTION INTRA-ARTICULAR; INTRALESIONAL; INTRAMUSCULAR; INTRAVENOUS; SOFT TISSUE
Status: DISCONTINUED | OUTPATIENT
Start: 2025-04-08 | End: 2025-04-08 | Stop reason: HOSPADM

## 2025-04-08 RX ORDER — OXYCODONE HYDROCHLORIDE 5 MG/1
2.5-5 TABLET ORAL EVERY 4 HOURS PRN
Qty: 10 TABLET | Refills: 0 | Status: SHIPPED | OUTPATIENT
Start: 2025-04-08

## 2025-04-08 RX ORDER — OXYCODONE HYDROCHLORIDE 5 MG/1
5 TABLET ORAL
Status: COMPLETED | OUTPATIENT
Start: 2025-04-08 | End: 2025-04-08

## 2025-04-08 RX ORDER — NALOXONE HYDROCHLORIDE 0.4 MG/ML
0.1 INJECTION, SOLUTION INTRAMUSCULAR; INTRAVENOUS; SUBCUTANEOUS
Status: DISCONTINUED | OUTPATIENT
Start: 2025-04-08 | End: 2025-04-08 | Stop reason: HOSPADM

## 2025-04-08 RX ORDER — ONDANSETRON 4 MG/1
4 TABLET, ORALLY DISINTEGRATING ORAL EVERY 30 MIN PRN
Status: DISCONTINUED | OUTPATIENT
Start: 2025-04-08 | End: 2025-04-08 | Stop reason: HOSPADM

## 2025-04-08 RX ORDER — MAGNESIUM HYDROXIDE 1200 MG/15ML
LIQUID ORAL PRN
Status: DISCONTINUED | OUTPATIENT
Start: 2025-04-08 | End: 2025-04-08 | Stop reason: HOSPADM

## 2025-04-08 RX ORDER — SODIUM CHLORIDE, SODIUM LACTATE, POTASSIUM CHLORIDE, CALCIUM CHLORIDE 600; 310; 30; 20 MG/100ML; MG/100ML; MG/100ML; MG/100ML
INJECTION, SOLUTION INTRAVENOUS CONTINUOUS
Status: DISCONTINUED | OUTPATIENT
Start: 2025-04-08 | End: 2025-04-08 | Stop reason: HOSPADM

## 2025-04-08 RX ORDER — LIDOCAINE HYDROCHLORIDE 20 MG/ML
INJECTION, SOLUTION INFILTRATION; PERINEURAL PRN
Status: DISCONTINUED | OUTPATIENT
Start: 2025-04-08 | End: 2025-04-08

## 2025-04-08 RX ORDER — PROPOFOL 10 MG/ML
INJECTION, EMULSION INTRAVENOUS PRN
Status: DISCONTINUED | OUTPATIENT
Start: 2025-04-08 | End: 2025-04-08

## 2025-04-08 RX ORDER — ONDANSETRON 2 MG/ML
4 INJECTION INTRAMUSCULAR; INTRAVENOUS EVERY 30 MIN PRN
Status: DISCONTINUED | OUTPATIENT
Start: 2025-04-08 | End: 2025-04-08 | Stop reason: HOSPADM

## 2025-04-08 RX ORDER — CEFAZOLIN SODIUM/WATER 2 G/20 ML
2 SYRINGE (ML) INTRAVENOUS SEE ADMIN INSTRUCTIONS
Status: DISCONTINUED | OUTPATIENT
Start: 2025-04-08 | End: 2025-04-08 | Stop reason: HOSPADM

## 2025-04-08 RX ORDER — DEXAMETHASONE SODIUM PHOSPHATE 4 MG/ML
INJECTION, SOLUTION INTRA-ARTICULAR; INTRALESIONAL; INTRAMUSCULAR; INTRAVENOUS; SOFT TISSUE PRN
Status: DISCONTINUED | OUTPATIENT
Start: 2025-04-08 | End: 2025-04-08

## 2025-04-08 RX ORDER — ONDANSETRON 2 MG/ML
INJECTION INTRAMUSCULAR; INTRAVENOUS PRN
Status: DISCONTINUED | OUTPATIENT
Start: 2025-04-08 | End: 2025-04-08

## 2025-04-08 RX ORDER — AMOXICILLIN 250 MG
1-2 CAPSULE ORAL 2 TIMES DAILY PRN
Qty: 10 TABLET | Refills: 0 | Status: SHIPPED | OUTPATIENT
Start: 2025-04-08

## 2025-04-08 RX ORDER — KETOROLAC TROMETHAMINE 30 MG/ML
INJECTION, SOLUTION INTRAMUSCULAR; INTRAVENOUS PRN
Status: DISCONTINUED | OUTPATIENT
Start: 2025-04-08 | End: 2025-04-08

## 2025-04-08 RX ORDER — PROPOFOL 10 MG/ML
INJECTION, EMULSION INTRAVENOUS CONTINUOUS PRN
Status: DISCONTINUED | OUTPATIENT
Start: 2025-04-08 | End: 2025-04-08

## 2025-04-08 RX ORDER — FENTANYL CITRATE 50 UG/ML
INJECTION, SOLUTION INTRAMUSCULAR; INTRAVENOUS PRN
Status: DISCONTINUED | OUTPATIENT
Start: 2025-04-08 | End: 2025-04-08

## 2025-04-08 RX ORDER — CEFAZOLIN SODIUM/WATER 2 G/20 ML
2 SYRINGE (ML) INTRAVENOUS
Status: COMPLETED | OUTPATIENT
Start: 2025-04-08 | End: 2025-04-08

## 2025-04-08 RX ORDER — HYDROMORPHONE HCL IN WATER/PF 6 MG/30 ML
0.4 PATIENT CONTROLLED ANALGESIA SYRINGE INTRAVENOUS EVERY 5 MIN PRN
Status: DISCONTINUED | OUTPATIENT
Start: 2025-04-08 | End: 2025-04-08 | Stop reason: HOSPADM

## 2025-04-08 RX ADMIN — DEXAMETHASONE SODIUM PHOSPHATE 4 MG: 4 INJECTION, SOLUTION INTRA-ARTICULAR; INTRALESIONAL; INTRAMUSCULAR; INTRAVENOUS; SOFT TISSUE at 09:47

## 2025-04-08 RX ADMIN — MIDAZOLAM 1 MG: 1 INJECTION INTRAMUSCULAR; INTRAVENOUS at 09:26

## 2025-04-08 RX ADMIN — KETOROLAC TROMETHAMINE 30 MG: 30 INJECTION, SOLUTION INTRAMUSCULAR at 10:14

## 2025-04-08 RX ADMIN — ONDANSETRON 4 MG: 2 INJECTION INTRAMUSCULAR; INTRAVENOUS at 10:14

## 2025-04-08 RX ADMIN — FENTANYL CITRATE 50 MCG: 50 INJECTION INTRAMUSCULAR; INTRAVENOUS at 09:33

## 2025-04-08 RX ADMIN — OXYCODONE HYDROCHLORIDE 5 MG: 5 TABLET ORAL at 10:54

## 2025-04-08 RX ADMIN — Medication 2 G: at 09:23

## 2025-04-08 RX ADMIN — Medication 200 MG: at 10:14

## 2025-04-08 RX ADMIN — SODIUM CHLORIDE, POTASSIUM CHLORIDE, SODIUM LACTATE AND CALCIUM CHLORIDE: 600; 310; 30; 20 INJECTION, SOLUTION INTRAVENOUS at 10:14

## 2025-04-08 RX ADMIN — FENTANYL CITRATE 50 MCG: 50 INJECTION INTRAMUSCULAR; INTRAVENOUS at 09:47

## 2025-04-08 RX ADMIN — PROPOFOL 150 MCG/KG/MIN: 10 INJECTION, EMULSION INTRAVENOUS at 09:33

## 2025-04-08 RX ADMIN — ROCURONIUM BROMIDE 30 MG: 50 INJECTION, SOLUTION INTRAVENOUS at 09:33

## 2025-04-08 RX ADMIN — SODIUM CHLORIDE, POTASSIUM CHLORIDE, SODIUM LACTATE AND CALCIUM CHLORIDE: 600; 310; 30; 20 INJECTION, SOLUTION INTRAVENOUS at 09:17

## 2025-04-08 RX ADMIN — PROPOFOL 150 MG: 10 INJECTION, EMULSION INTRAVENOUS at 09:33

## 2025-04-08 RX ADMIN — LIDOCAINE HYDROCHLORIDE 80 MG: 20 INJECTION, SOLUTION INFILTRATION; PERINEURAL at 09:33

## 2025-04-08 ASSESSMENT — ACTIVITIES OF DAILY LIVING (ADL)
ADLS_ACUITY_SCORE: 48

## 2025-04-08 NOTE — PROGRESS NOTES
I have reviewed the surgical (or preoperative) H&P that is linked to this encounter, and examined the patient. There are no significant changes    Clinical Conditions Present on Arrival:  Clinically Significant Risk Factors Present on Admission          # Hyperchloremia: Highest Cl = 108 mmol/L in last 30 days, will monitor as appropriate

## 2025-04-08 NOTE — ANESTHESIA PROCEDURE NOTES
Airway       Patient location during procedure: OR       Procedure Start/Stop Times: 4/8/2025 9:34 AM  Staff -        Anesthesiologist:  Humza Gaitan MD       CRNA: Janee Benjamin APRN CRNA       Performed By: CRNAIndications and Patient Condition       Indications for airway management: jorgito-procedural       Induction type:intravenous       Mask difficulty assessment: 1 - vent by mask    Final Airway Details       Final airway type: endotracheal airway       Successful airway: ETT - single  Endotracheal Airway Details        ETT size (mm): 7.0       Cuffed: yes       Successful intubation technique: direct laryngoscopy       DL Blade Type: Lawson 2       Grade View of Cords: 1       Adjucts: stylet       Position: Left       Measured from: lips       Secured at (cm): 20       Bite block used: None    Post intubation assessment        Placement verified by: capnometry, equal breath sounds and chest rise        Number of attempts at approach: 1       Number of other approaches attempted: 0       Secured with: tape       Ease of procedure: easy       Dentition: Intact and Unchanged    Medication(s) Administered   Medication Administration Time: 4/8/2025 9:34 AM

## 2025-04-08 NOTE — ANESTHESIA POSTPROCEDURE EVALUATION
Patient: Naa Yap    Procedure: Procedure(s):  CHOLECYSTECTOMY, LAPAROSCOPIC       Anesthesia Type:  General    Note:     Postop Pain Control: Uneventful            Sign Out: Well controlled pain   PONV: No   Neuro/Psych: Uneventful            Sign Out: Acceptable/Baseline neuro status   Airway/Respiratory: Uneventful            Sign Out: Acceptable/Baseline resp. status   CV/Hemodynamics: Uneventful            Sign Out: Acceptable CV status; No obvious hypovolemia; No obvious fluid overload   Other NRE: NONE   DID A NON-ROUTINE EVENT OCCUR? No           Last vitals:  Vitals Value Taken Time   /65 04/08/25 1032   Temp 36  C (96.8  F) 04/08/25 1043   Pulse 80 04/08/25 1044   Resp 19 04/08/25 1044   SpO2 99 % 04/08/25 1044   Vitals shown include unfiled device data.    Electronically Signed By: Humza Gaitan MD  April 8, 2025  10:45 AM

## 2025-04-08 NOTE — OR NURSING
Meets criteria for discharge.  Discharge instructions reviewed with pt and pt's designated responsible party.  Pt label on prescription bag from pharmacy matched to pt's wristband. Pharmacy bag opened with Oxycodone and senokot prescriptions inside. Medications were reviewed to match pt wristband while pt and significant other agreed with identification. Prescriptions placed back in pharmacy bag resealed with tape and placed in pt belongings per pt request.

## 2025-04-08 NOTE — DISCHARGE INSTRUCTIONS
Same Day Surgery Discharge Instructions for  Sedation and General Anesthesia     It's not unusual to feel dizzy, light-headed or faint for up to 24 hours after surgery or while taking pain medication.  If you have these symptoms: sit for a few minutes before standing and have someone assist you when you get up to walk or use the bathroom.    You should rest and relax for the next 24 hours. We recommend you make arrangements to have an adult stay with you for at least 24 hours after your discharge.  Avoid hazardous and strenuous activity.    DO NOT DRIVE any vehicle or operate mechanical equipment for 24 hours following the end of your surgery.  Even though you may feel normal, your reactions may be affected by the medication you have received.    Do not drink alcoholic beverages for 24 hours following surgery.     Slowly progress to your regular diet as you feel able. It's not unusual to feel nauseated and/or vomit after receiving anesthesia.  If you develop these symptoms, drink clear liquids (apple juice, ginger ale, broth, 7-up, etc. ) until you feel better.  If your nausea and vomiting persists for 24 hours, please notify your surgeon.      All narcotic pain medications, along with inactivity and anesthesia, can cause constipation. Drinking plenty of liquids and increasing fiber intake will help.    For any questions of a medical nature, call your surgeon.    Do not make important decisions for 24 hours.    If you had general anesthesia, you may have a sore throat for a couple of days related to the breathing tube used during surgery.  You may use Cepacol lozenges to help with this discomfort.  If it worsens or if you develop a fever, contact your surgeon.     If you feel your pain is not well managed with the pain medications prescribed by your surgeon, please contact your surgeon's office to let them know so they can address your concerns.       You were given the medication Sugammadex that may decrease  effectiveness of birth control.  We recommend you use a backup method of birth control for 7 days after surgery.  Continue to take your hormonal contraceptive during this period.      [facility]  Discharge Instructions: Post-Operative Cholecystectomy    ACTIVITY  Expect to feel tired after your surgery.  This will gradually resolve.    Take frequent, short walks and increase your activity gradually.    Avoid strenuous physical activity or heavy lifting greater than 15-20 lbs. for 2-3 weeks.  You may climb stairs.  You may drive without restrictions when you are not using any prescription pain medication and feel comfortable in a car.  You may return to work/school when you are comfortable without any prescription pain medication.    WOUND CARE  You may remove your outer dressing or Band-Aids and shower 48 hours after the surgery.  Pat your incisions dry and leave them open to air.  Re-apply dressing (Band-Aids or gauze/tape) as needed for comfort or drainage.  You may have steri-strips (looks like white tape) on your incision.  You may peel off the steri-strips 2 weeks after your surgery if they have not peeled off on their own.  If you have skin glue, it will peel up and fall off on its own.  Do not soak your incisions in a tub or pool for 2 weeks.   Do not apply any lotions, creams, or ointments to your incisions.  A ridge under your incisions is normal and will gradually resolve.    DIET  Start with liquids, then gradually resume your regular diet as tolerated.  Avoid heavy, spicy, and greasy meals for 2-3 days.  Drink plenty of fluids to stay hydrated.  It is not uncommon to experience some loose stools or diarrhea after surgery.  This is your body's way of adapting to the bile which will slowly drain into your intestine.  A low fat diet may help with this.  This should improve over 1-2 months.    PAIN  Expect some tenderness and discomfort at the incision sites.  Use the prescribed  pain medication at your discretion.  Expect gradual resolution of your pain over several days.  You may take ibuprofen with food (unless you have been told not to) or acetaminophen/Tylenol instead of or in addition to your prescribed pain medication.  However, if you are taking Norco or Percocet, do not take any additional acetaminophen/Tylenol.  Do not drink alcohol or drive while you are taking pain medications.  You may apply ice to your incisions in 20 minute intervals as needed for the next 48 hours.  After that time, consider switching to heat if you prefer.    EXPECTATIONS  Pain medications can cause constipation.  Limit use when possible.  Take an over the counter or prescribed stool softener/stimulant, such as Colace or Senna, 1-2 times a day with plenty of water.  You may take a mild over the counter laxative, such as Miralax or a suppository, as needed.  You may discontinue these medications once you are having regular bowel movements and/or are no longer taking your narcotic pain medication.    You may have shoulder or upper back discomfort due to the gas used in surgery.  This is temporary and should resolve in 48-72 hours.  Short, frequent walks may help with this.  If you are unable to urinate for 8 hours or feel as though you are not emptying your bladder adequately, we recommend you seek care at an ER or Urgent Care facility for possible catheter placement.     FOLLOW UP  Our office will contact you in approximately 2-3 weeks to check on your progress and answer any questions you may have.  If you are doing well, you will not need to return for a follow up appointment.  If any concerns are identified over the phone, we will help you make an appointment to see a provider.   If you have not received a phone call, have any questions or concerns, or would like to be seen, please call us at [phone] and ask to speak with our nurse.  We are located at [address] [place] [address] [address] [place] [address]  [phone].    CALL OUR OFFICE -[phone] IF YOU HAVE:   Chills or fever above 101 F.  Increased redness, warmth, or drainage at your incisions.  Significant bleeding.  Pain not relieved by your pain medication or rest.  Increasing pain after the first 48 hours.  Any other concerns or questions.                        Revised October 2022         **If you have questions or concerns about your procedure,  call Dr. Contreras at [phone]**

## 2025-04-08 NOTE — OP NOTE
General Surgery Operative Note    PREOPERATIVE DIAGNOSIS:  Calculus of gallbladder without cholecystitis without obstruction [K80.20]    POSTOPERATIVE DIAGNOSIS:  same    PROCEDURE:     CHOLECYSTECTOMY, LAPAROSCOPIC    ANESTHESIA:  General.    PREOPERATIVE MEDICATIONS:  Ancef IV.    SURGEON:  Edgar Contreras MD    ASSISTANT:  Gurvinder Combs PA-C, Physician assistant first assistant was necessary during the performance of this procedure for expertise in patient positioning, prepping, draping, trocar placement, camera management, retraction and exposure, and suctioning.     INDICATIONS: Patient presented with signs and symptoms consistent with symptomatic gallstones.  Extensive discussion was undertaken regarding the procedure of cholecystectomy.  The potential risks of bleeding, infection, bile duct or visceral injury were thoroughly reviewed.  All of the patient's questions were answered.  The patient wishes to proceed with cholecystectomy.    PROCEDURE:  After informed consent was obtained the patient was taken to the operating suite and uneventfully endotracheally intubated.  The abdomen was prepped and draped in a sterile fashion.  Surgeon initiated timeout was acknowledged.  After infiltration with local anesthestic a curvilinear incision was made in the infraumbilical position and through this a Verees needle was passed intraperitoneally.  Position was confirmed using the saline drop test. A CO2 pneumoperitoneum was then created.  After adequate insufflation the needle was removed and replaced with an 11mm trocar .  Two other trocars were placed under laparoscopic visualization following the infiltration of local anesthetic.  We elevated the liver and were able to identify the gallbladder.  The gallbladder was grasped and used to elevate the liver further.  I began dissecting out some fatty adhesions down near the neck of the gallbladder until a cystic duct was encountered.  I continued the dissection using  combination of sharp and blunt dissection until the cystic duct was largely dissected out.  I continued the dissection up along the sides of the gallbladder, both medially and laterally, until I had created a space between the gallbladder and the liver.  At this point, I encountered the cystic artery, just posterior and lateral to the cystic duct.  This again was dissected out.  Once I had created a window where only the cystic artery and duct were noted to be entering the gallbladder, I felt that this represented our critical view.  The cystic artery and duct were then doubly clipped and divided.  I continued the dissection up along the body of the gallbladder, freeing all attachments and adhesions of the gallbladder to the liver.  Gallbladder was removed from the liver in an atraumatic fashion.  The gallbladder was then placed in a retrieval pouch and removed from the abdomen.  The gallbladder fossa was reinspected, and all areas of bleeding were managed with electrocautery.  I irrigated the area with normal saline and aspirated it out.  I then reinspected the abdomen, and everything appeared to be in pristine condition. The trocars were removed and carbon dioxide was massaged from the abdomen. Local anesthetic was injected. Fascia at the 11mm port site was closed with 0 vicryl suture.  Skin incisions were closed with subcuticular 4-0 Monocryl sutures.  The patient tolerated this procedure without difficulty. Needle and sponge counts were correct. The patient was taken from the operating room To the recovery room in a stable condition.        ESTIMATED BLOOD LOSS:  5cc    Edgar Contreras MD

## 2025-04-08 NOTE — ANESTHESIA CARE TRANSFER NOTE
Patient: Naa Yap    Procedure: Procedure(s):  CHOLECYSTECTOMY, LAPAROSCOPIC       Diagnosis: Calculus of gallbladder without cholecystitis without obstruction [K80.20]  Diagnosis Additional Information: No value filed.    Anesthesia Type:   General     Note:    Oropharynx: oropharynx clear of all foreign objects and spontaneously breathing    Oxygen Supplementation: face mask  Level of Supplemental Oxygen (L/min / FiO2): 10  Independent Airway: airway patency satisfactory and stable  Dentition: dentition unchanged  Vital Signs Stable: post-procedure vital signs reviewed and stable  Report to RN Given: handoff report given  Patient transferred to: PACU  Comments: Pt awake and able to verbalize needs. ALL monitors on and audible. Spontaneous respiration without difficulty. Pt denies pain. Report given to PACU RN.  present upon transfer of care.  Handoff Report: Identifed the Patient, Identified the Reponsible Provider, Reviewed the pertinent medical history, Discussed the surgical course, Reviewed Intra-OP anesthesia mangement and issues during anesthesia, Set expectations for post-procedure period and Allowed opportunity for questions and acknowledgement of understanding      Vitals:  Vitals Value Taken Time   /65 04/08/25 1032   Temp 35.9  C (96.62  F) 04/08/25 1035   Pulse 80 04/08/25 1037   Resp 12 04/08/25 1037   SpO2 100 % 04/08/25 1037   Vitals shown include unfiled device data.    Electronically Signed By: TALIA Everett CRNA  April 8, 2025  10:38 AM

## 2025-04-08 NOTE — ANESTHESIA PREPROCEDURE EVALUATION
Anesthesia Pre-Procedure Evaluation    Patient: Naa Yap   MRN: [id number] : 1985        Procedure : Procedure(s):  CHOLECYSTECTOMY, LAPAROSCOPIC          Past Medical History:   Diagnosis Date    Gestational diabetes     Known health problems: none     Pre-eclampsia       Past Surgical History:   Procedure Laterality Date     SECTION       SECTION N/A 2023    Procedure: REPEAT  SECTION;  Surgeon: Marianne Peck MD;  Location:  L+D      No Known Allergies   Social History     Tobacco Use    Smoking status: Never    Smokeless tobacco: Never   Substance Use Topics    Alcohol use: Not Currently      Wt Readings from Last 1 Encounters:   25 60.7 kg (133 lb 12.8 oz)        Anesthesia Evaluation   Pt has had prior anesthetic. Type: Regional.    No history of anesthetic complications       ROS/MED HX  ENT/Pulmonary:  - neg pulmonary ROS     Neurologic:  - neg neurologic ROS     Cardiovascular:  - neg cardiovascular ROS  (-) hypertension   METS/Exercise Tolerance:     Hematologic:       Musculoskeletal:       GI/Hepatic:     (+)          cholecystitis/cholelithiasis,       (-) GERD   Renal/Genitourinary:  - neg Renal ROS     Endo:  - neg endo ROS  (-) Type I DM   Psychiatric/Substance Use:       Infectious Disease:       Malignancy:       Other:            Physical Exam    Airway        Mallampati: II   TM distance: > 3 FB   Neck ROM: full   Mouth opening: > 3 cm    Respiratory Devices and Support         Dental       (+) Completely normal teeth      Cardiovascular   cardiovascular exam normal          Pulmonary   pulmonary exam normal                OUTSIDE LABS:  CBC:   Lab Results   Component Value Date    WBC 6.2 2025    WBC 9.4 2023    HGB 12.1 2025    HGB 11.6 (L) 2023    HCT 35.7 2025    HCT 41.0 2023     2025     2023     BMP:   Lab Results   Component Value Date     2025    POTASSIUM 3.9 2025     CHLORIDE 108 (H) 03/20/2025    CO2 24 03/20/2025    BUN 12.1 03/20/2025    CR 0.71 03/20/2025    CR 0.77 07/11/2023     (H) 03/20/2025    GLC 76 07/13/2023     COAGS:   Lab Results   Component Value Date    PTT 25 07/11/2023    INR 0.91 07/11/2023     POC:   Lab Results   Component Value Date    HCGS Negative 03/20/2025     HEPATIC:   Lab Results   Component Value Date    ALBUMIN 4.3 03/20/2025    PROTTOTAL 7.2 03/20/2025    ALT 14 03/20/2025    AST 13 03/20/2025    ALKPHOS 64 03/20/2025    BILITOTAL 0.2 03/20/2025     OTHER:   Lab Results   Component Value Date    BANDAR 8.8 03/20/2025    LIPASE 40 03/20/2025       Anesthesia Plan    ASA Status:  2    NPO Status:  NPO Appropriate    Anesthesia Type: General.     - Airway: ETT   Induction: Intravenous, Propofol.   Maintenance: Balanced.        Consents    Anesthesia Plan(s) and associated risks, benefits, and realistic alternatives discussed. Questions answered and patient/representative(s) expressed understanding.     - Discussed:     - Discussed with:  Patient,             Postoperative Care    Pain management: IV analgesics.   PONV prophylaxis: Ondansetron (or other 5HT-3), Background Propofol Infusion     Comments:               Humza Gaitan MD    Clinically Significant Risk Factors Present on Admission                   # Hypertension: Noted on problem list

## 2025-04-08 NOTE — BRIEF OP NOTE
[facility]    Brief Operative Note    Pre-operative diagnosis: Calculus of gallbladder without cholecystitis without obstruction [K80.20]  Post-operative diagnosis Cholelithiasis    Procedure: CHOLECYSTECTOMY, LAPAROSCOPIC, N/A - Abdomen    Surgeon: Surgeons and Role:     * Edgar Contreras MD - Primary     * Gurvinder Combs PA-C - Assisting    Anesthesia: General   Estimated Blood Loss: Less than 10 ml    Drains: None  Specimens:   ID Type Source Tests Collected by Time Destination   1 : Ballbladder and Contents Tissue Gallbladder with Stone(s) SURGICAL PATHOLOGY EXAM Edgar Contreras MD 4/8/2025  9:54 AM      Findings:   See Operative Report.  Gallbladder grossly normal .  Complications: None.  Implants: * No implants in log *        Jem Combs PA-C  [phone]

## 2025-04-08 NOTE — OR NURSING
Discharge to home. Discharge instructions given to pt and pt  using .  No questions or concerns.  Iv discontinued.  Prescriptions and discharge paperwork placed in pt belonging bag and sent with pt

## 2025-04-09 LAB
PATH REPORT.COMMENTS IMP SPEC: NORMAL
PATH REPORT.COMMENTS IMP SPEC: NORMAL
PATH REPORT.FINAL DX SPEC: NORMAL
PATH REPORT.GROSS SPEC: NORMAL
PATH REPORT.MICROSCOPIC SPEC OTHER STN: NORMAL
PATH REPORT.RELEVANT HX SPEC: NORMAL
PHOTO IMAGE: NORMAL

## 2025-04-23 ENCOUNTER — TELEPHONE (OUTPATIENT)
Dept: SURGERY | Facility: CLINIC | Age: 40
End: 2025-04-23
Payer: COMMERCIAL

## 2025-04-23 NOTE — TELEPHONE ENCOUNTER
[facility]  Post op call note - No Answer    Naa Yap was called for an update regarding her recovery.  There was no answer and a message was left instructing the patient to call the office with any questions or concerns.     Gurvinder Combs PA-C

## 2025-04-28 ENCOUNTER — APPOINTMENT (OUTPATIENT)
Dept: GENERAL RADIOLOGY | Facility: CLINIC | Age: 40
End: 2025-04-28
Attending: EMERGENCY MEDICINE
Payer: COMMERCIAL

## 2025-04-28 ENCOUNTER — HOSPITAL ENCOUNTER (EMERGENCY)
Facility: CLINIC | Age: 40
Discharge: HOME | End: 2025-04-28
Attending: EMERGENCY MEDICINE | Admitting: EMERGENCY MEDICINE
Payer: COMMERCIAL

## 2025-04-28 VITALS
OXYGEN SATURATION: 99 % | DIASTOLIC BLOOD PRESSURE: 84 MMHG | SYSTOLIC BLOOD PRESSURE: 130 MMHG | HEART RATE: 72 BPM | RESPIRATION RATE: 16 BRPM | TEMPERATURE: 97.4 F

## 2025-04-28 DIAGNOSIS — R09.1 PLEURISY: ICD-10-CM

## 2025-04-28 LAB
ANION GAP SERPL CALCULATED.3IONS-SCNC: 11 MMOL/L (ref 7–15)
BASOPHILS # BLD AUTO: 0 10E3/UL (ref 0–0.2)
BASOPHILS NFR BLD AUTO: 0 %
BUN SERPL-MCNC: 8.6 MG/DL (ref 6–20)
CALCIUM SERPL-MCNC: 9.1 MG/DL (ref 8.8–10.4)
CHLORIDE SERPL-SCNC: 102 MMOL/L (ref 98–107)
CREAT SERPL-MCNC: 0.68 MG/DL (ref 0.51–0.95)
D DIMER PPP FEU-MCNC: 0.28 UG/ML FEU (ref 0–0.5)
EGFRCR SERPLBLD CKD-EPI 2021: >90 ML/MIN/1.73M2
EOSINOPHIL # BLD AUTO: 0.1 10E3/UL (ref 0–0.7)
EOSINOPHIL NFR BLD AUTO: 1 %
ERYTHROCYTE [DISTWIDTH] IN BLOOD BY AUTOMATED COUNT: 11.6 % (ref 10–15)
GLUCOSE SERPL-MCNC: 98 MG/DL (ref 70–99)
HCO3 SERPL-SCNC: 25 MMOL/L (ref 22–29)
HCT VFR BLD AUTO: 37.9 % (ref 35–47)
HGB BLD-MCNC: 13.1 G/DL (ref 11.7–15.7)
HOLD SPECIMEN: NORMAL
HOLD SPECIMEN: NORMAL
IMM GRANULOCYTES # BLD: 0 10E3/UL
IMM GRANULOCYTES NFR BLD: 0 %
LYMPHOCYTES # BLD AUTO: 1.5 10E3/UL (ref 0.8–5.3)
LYMPHOCYTES NFR BLD AUTO: 26 %
MCH RBC QN AUTO: 28.1 PG (ref 26.5–33)
MCHC RBC AUTO-ENTMCNC: 34.6 G/DL (ref 31.5–36.5)
MCV RBC AUTO: 81 FL (ref 78–100)
MONOCYTES # BLD AUTO: 0.3 10E3/UL (ref 0–1.3)
MONOCYTES NFR BLD AUTO: 6 %
NEUTROPHILS # BLD AUTO: 3.7 10E3/UL (ref 1.6–8.3)
NEUTROPHILS NFR BLD AUTO: 66 %
NRBC # BLD AUTO: 0 10E3/UL
NRBC BLD AUTO-RTO: 0 /100
PLATELET # BLD AUTO: 216 10E3/UL (ref 150–450)
POTASSIUM SERPL-SCNC: 3.5 MMOL/L (ref 3.4–5.3)
RBC # BLD AUTO: 4.66 10E6/UL (ref 3.8–5.2)
SODIUM SERPL-SCNC: 138 MMOL/L (ref 135–145)
TROPONIN T SERPL HS-MCNC: <6 NG/L
WBC # BLD AUTO: 5.6 10E3/UL (ref 4–11)

## 2025-04-28 PROCEDURE — 96374 THER/PROPH/DIAG INJ IV PUSH: CPT | Performed by: EMERGENCY MEDICINE

## 2025-04-28 PROCEDURE — 250N000011 HC RX IP 250 OP 636: Performed by: EMERGENCY MEDICINE

## 2025-04-28 PROCEDURE — 36415 COLL VENOUS BLD VENIPUNCTURE: CPT | Performed by: EMERGENCY MEDICINE

## 2025-04-28 PROCEDURE — 85379 FIBRIN DEGRADATION QUANT: CPT | Performed by: EMERGENCY MEDICINE

## 2025-04-28 PROCEDURE — 99285 EMERGENCY DEPT VISIT HI MDM: CPT | Mod: 25 | Performed by: EMERGENCY MEDICINE

## 2025-04-28 PROCEDURE — 85004 AUTOMATED DIFF WBC COUNT: CPT | Performed by: EMERGENCY MEDICINE

## 2025-04-28 PROCEDURE — 84484 ASSAY OF TROPONIN QUANT: CPT | Performed by: EMERGENCY MEDICINE

## 2025-04-28 PROCEDURE — 80048 BASIC METABOLIC PNL TOTAL CA: CPT | Performed by: EMERGENCY MEDICINE

## 2025-04-28 PROCEDURE — 96375 TX/PRO/DX INJ NEW DRUG ADDON: CPT | Performed by: EMERGENCY MEDICINE

## 2025-04-28 PROCEDURE — 71046 X-RAY EXAM CHEST 2 VIEWS: CPT

## 2025-04-28 PROCEDURE — 93005 ELECTROCARDIOGRAM TRACING: CPT | Performed by: EMERGENCY MEDICINE

## 2025-04-28 RX ORDER — ONDANSETRON 2 MG/ML
4 INJECTION INTRAMUSCULAR; INTRAVENOUS ONCE
Status: COMPLETED | OUTPATIENT
Start: 2025-04-28 | End: 2025-04-28

## 2025-04-28 RX ORDER — PREDNISONE 20 MG/1
TABLET ORAL
Qty: 10 TABLET | Refills: 0 | Status: SHIPPED | OUTPATIENT
Start: 2025-04-28

## 2025-04-28 RX ORDER — KETOROLAC TROMETHAMINE 15 MG/ML
15 INJECTION, SOLUTION INTRAMUSCULAR; INTRAVENOUS ONCE
Status: COMPLETED | OUTPATIENT
Start: 2025-04-28 | End: 2025-04-28

## 2025-04-28 RX ADMIN — ONDANSETRON 4 MG: 2 INJECTION, SOLUTION INTRAMUSCULAR; INTRAVENOUS at 14:46

## 2025-04-28 RX ADMIN — KETOROLAC TROMETHAMINE 15 MG: 15 INJECTION, SOLUTION INTRAMUSCULAR; INTRAVENOUS at 14:46

## 2025-04-28 ASSESSMENT — ACTIVITIES OF DAILY LIVING (ADL)
ADLS_ACUITY_SCORE: 48

## 2025-04-28 NOTE — ED TRIAGE NOTES
Pt c/o L sided CP x2 days that is worse with movement. Pt reports pain started after she ate.      Triage Assessment (Adult)       Row Name 04/28/25 8636          Triage Assessment    Airway WDL WDL        Respiratory WDL    Respiratory WDL WDL        Cardiac WDL    Cardiac WDL chest pain        Chest Pain Assessment    Chest Pain Location anterior chest, left     Duration 2 days        Cognitive/Neuro/Behavioral WDL    Cognitive/Neuro/Behavioral WDL WDL

## 2025-04-28 NOTE — ED PROVIDER NOTES
Emergency Department Note      History of Present Illness     Chief Complaint   Chest Pain    HPI   Naa Yap is a 39 year old female with a history of arrhythmia presenting with left sided chest pain that started two night ago (4/26/25). Her pain worsens with movement. Naa can feel the pain with deep breathing, but her pain did not worsen. The patient endorses decreased food consumption, but is able to swallow liquids normally. She denies associated back pain or history of blood clots. Of note, Naa underwent a cholecystectomy on 4/8/25. The patient is currently menstruating .    The above history was obtained using a professional  Mandarin  interpretor.     Independent Historian   None    Review of External Notes       Past Medical History     Medical History and Problem List   Gestational diabetes  Known health problems: none  Pre-eclampsia    Medications   The patient is not currently taking any regular medications.      Surgical History   C section    Physical Exam     Patient Vitals for the past 24 hrs:   BP Temp Temp src Pulse Resp SpO2   04/28/25 1338 130/84 97.4  F (36.3  C) Temporal 72 16 99 %     Physical Exam  General: Resting comfortably on a chair in intake.  Head:  The scalp, face, and head appear normal  Eyes:  The pupils are equal, round, and reactive to light    There is no nystagmus    Extraocular muscles are intact    Conjunctivae and sclerae are normal  ENT:    The nose is normal    Pinnae are normal    The oropharynx is normal  Neck:  Normal range of motion    There is no rigidity noted  CV:  Normal rate  Normal rhythm     Normal S1/S2    No pathological murmur detected  Resp:  Lungs are clear    There is no tachypnea    Non-labored    No rales    No wheezing   GI:  Abdomen is soft, there is no rigidity    No distension    No rebound tenderness     Non-surgical without peritoneal features    Well healed laparoscopic surgical scars   MS:  Normal muscular tone    Symmetric motor  strength  Skin:  No rash or acute skin lesions noted  Neuro:  Speech is normal and fluent, there is no aphasia    No motor deficits    Cranial nerves are intact  Psych: Awake. Alert.      Normal affect    Diagnostics     Lab Results   Labs Ordered and Resulted from Time of ED Arrival to Time of ED Departure   BASIC METABOLIC PANEL - Normal       Result Value    Sodium 138      Potassium 3.5      Chloride 102      Carbon Dioxide (CO2) 25      Anion Gap 11      Urea Nitrogen 8.6      Creatinine 0.68      GFR Estimate >90      Calcium 9.1      Glucose 98     TROPONIN T, HIGH SENSITIVITY - Normal    Troponin T, High Sensitivity <6     D DIMER QUANTITATIVE - Normal    D-Dimer Quantitative 0.28     CBC WITH PLATELETS AND DIFFERENTIAL    WBC Count 5.6      RBC Count 4.66      Hemoglobin 13.1      Hematocrit 37.9      MCV 81      MCH 28.1      MCHC 34.6      RDW 11.6      Platelet Count 216      % Neutrophils 66      % Lymphocytes 26      % Monocytes 6      % Eosinophils 1      % Basophils 0      % Immature Granulocytes 0      NRBCs per 100 WBC 0      Absolute Neutrophils 3.7      Absolute Lymphocytes 1.5      Absolute Monocytes 0.3      Absolute Eosinophils 0.1      Absolute Basophils 0.0      Absolute Immature Granulocytes 0.0      Absolute NRBCs 0.0       Imaging   XR Chest 2 Views   Final Result   IMPRESSION: No focal consolidation, pleural effusion or pneumothorax. Cardiomediastinal silhouette is unremarkable.        EKG   ECG results from 04/28/25   EKG 12 lead     Value    Systolic Blood Pressure     Diastolic Blood Pressure     Ventricular Rate 72    Atrial Rate 72    OR Interval 144    QRS Duration 92        QTc 448    P Axis 67    R AXIS 19    T Axis 49    Interpretation ECG      Sinus rhythm with sinus arrhythmia  Poor R-wave progression ; consider anterior infarct, lead placement, or normal variant  Abnormal ECG  When compared with ECG of 20-Mar-2025 15:35,  No significant change was found  EKG interpreted  by me at 1421       Independent Interpretation   CXR: Normal cardiac size. Lungs are non-acute. Ribs are normal.    ED Course      Medications Administered   Medications   ketorolac (TORADOL) injection 15 mg (15 mg Intravenous $Given 4/28/25 1446)   ondansetron (ZOFRAN) injection 4 mg (4 mg Intravenous $Given 4/28/25 1446)     Procedures   Procedures     Discussion of Management   None    ED Course   ED Course as of 04/28/25 1643   Mon Apr 28, 2025   1421 I obtained the history and examined the patient as noted above.      1634 I rechecked and updated the patient using a professional Mandarin interpretor. The patient feels improved.           Additional Documentation  None    Medical Decision Making / Diagnosis     CMS Diagnoses: None    MIPS    None    [facility]   Naa Yap is a 39 year old female presents to the emergency department with left-sided chest pain that began 2 nights ago.  It is worse with movement and she can feel little little bit more with deep breathing.  She denies significant recent infectious symptoms.  No history of DVT or PE.  No history of cardiac illness, or arrhythmia.  Patient is currently on her menstrual cycle.  She came in to the emergency department for a checkup.  Screening laboratories in the emergency department are entirely normal.  EKG was normal.  Quantitative D-dimer is normal, this has a high negative predictive value for the exclusion of occult DVT and PE and in combination with a low risk patient essentially rules out significant venal thromboembolic disease.  Chest x-ray is within normal limits.  There is a broad differential diagnosis to chest pain of this kind which can include pneumothorax, pneumonia, arrhythmia, MI, PE, pneumothorax, pneumonia, mass lesion, among many other etiologies.  No life-threatening etiologies were discovered in the emergency department encounter.  Patient will be given a trial of corticosteroids for a pleurodynia or pleuritic type chest pain given the  fact that the nonsteroidal anti-inflammatories were quite helpful in the ED.    Disposition   The patient was discharged.     Diagnosis     ICD-10-CM    1. Pleurisy  R09.1          Discharge Medications   New Prescriptions    PREDNISONE (DELTASONE) 20 MG TABLET    Take two tablets (= 40mg) each day for 5 (five) days     Scribe Disclosure:  Ada BOJORQUEZ, am serving as a scribe at 2:02 PM on 4/28/2025 to document services personally performed by Phillip Bender MD based on my observations and the provider's statements to me.        Phillip Bender MD  04/28/25 [id number]

## 2025-04-29 LAB
ATRIAL RATE - MUSE: 72 BPM
DIASTOLIC BLOOD PRESSURE - MUSE: NORMAL MMHG
INTERPRETATION ECG - MUSE: NORMAL
P AXIS - MUSE: 67 DEGREES
PR INTERVAL - MUSE: 144 MS
QRS DURATION - MUSE: 92 MS
QT - MUSE: 410 MS
QTC - MUSE: 448 MS
R AXIS - MUSE: 19 DEGREES
SYSTOLIC BLOOD PRESSURE - MUSE: NORMAL MMHG
T AXIS - MUSE: 49 DEGREES
VENTRICULAR RATE- MUSE: 72 BPM

## (undated) DEVICE — ESU GROUND PAD UNIVERSAL W/O CORD

## (undated) DEVICE — PACK C-SECTION LF PL15OTA83B

## (undated) DEVICE — SU MONOCRYL 4-0 PS-2 18" UND Y496G

## (undated) DEVICE — SOL NACL 0.9% INJ 1000ML BAG 2B1324X

## (undated) DEVICE — BLADE CLIPPER 4406

## (undated) DEVICE — PACK LAP CHOLE SLC15LCFSD

## (undated) DEVICE — ENDO TROCAR FIRST ENTRY KII FIOS Z-THRD 05X100MM CTF03

## (undated) DEVICE — DECANTER BAG 2002S

## (undated) DEVICE — GOWN LG DISP 9515

## (undated) DEVICE — SUCTION MANIFOLD NEPTUNE 2 SYS 4 PORT 0702-020-000

## (undated) DEVICE — SU VICRYL 0 CTX 36" J370H

## (undated) DEVICE — LINEN TOWEL PACK X5 5464

## (undated) DEVICE — SOL WATER IRRIG 1000ML BOTTLE 07139-09

## (undated) DEVICE — GOWN IMPERVIOUS SPECIALTY XLG/XLONG 32474

## (undated) DEVICE — SOL NACL 0.9% IRRIG 1000ML BOTTLE 07138-09

## (undated) DEVICE — ESU HOLDER LAP INST DISP PURPLE LONG 330MM H-PRO-330

## (undated) DEVICE — SU VICRYL 0 CT-1 36" J346H

## (undated) DEVICE — PREP CHLORAPREP 26ML TINTED HI-LITE ORANGE 930815

## (undated) DEVICE — CATH TRAY FOLEY 16FR BARDEX W/DRAIN BAG STATLOCK 300316A

## (undated) DEVICE — SU VICRYL 4-0 PS-2 18" UND J496H

## (undated) DEVICE — DRSG STERI STRIP 1/2X4" R1547

## (undated) DEVICE — ENDO SCOPE WARMER LF TM500

## (undated) DEVICE — ENDO TROCAR FIRST ENTRY KII FIOS Z-THRD 11X100MM CTF33

## (undated) DEVICE — SOL WATER IRRIG 1000ML BOTTLE 2F7114

## (undated) DEVICE — NDL INSUFFLATION 13GA 120MM C2201

## (undated) DEVICE — LINEN C-SECTION 5415

## (undated) DEVICE — ANTIFOG SOLUTION W/FOAM PAD 31142527

## (undated) DEVICE — BAG DECANTER STERILE WHITE DYNJDEC09

## (undated) DEVICE — SUCTION TRAP DELEE 10FR 20ML

## (undated) DEVICE — EVAC SYSTEM CLEAR FLOW SC082500

## (undated) DEVICE — ENDO TROCAR SLEEVE KII Z-THREADED 05X100MM CTS02

## (undated) DEVICE — DRAPE IOBAN C-SECTION W/POUCH 30X35" 6657

## (undated) DEVICE — SUCTION IRR STRYKERFLOW II W/TIP 250-070-520

## (undated) DEVICE — POUCH TISSUE RETRIEVAL LAP 10MM 2.21" INTRO TRS100SB2

## (undated) DEVICE — GLOVE BIOGEL PI SZ 8.0 40880

## (undated) DEVICE — CLIP APPLIER ENDO 5MM M/L LIGAMAX EL5ML

## (undated) RX ORDER — MORPHINE SULFATE 1 MG/ML
INJECTION, SOLUTION EPIDURAL; INTRATHECAL; INTRAVENOUS
Status: DISPENSED
Start: 2023-07-11

## (undated) RX ORDER — FENTANYL CITRATE 50 UG/ML
INJECTION, SOLUTION INTRAMUSCULAR; INTRAVENOUS
Status: DISPENSED
Start: 2025-04-08

## (undated) RX ORDER — ONDANSETRON 2 MG/ML
INJECTION INTRAMUSCULAR; INTRAVENOUS
Status: DISPENSED
Start: 2023-07-11

## (undated) RX ORDER — FENTANYL CITRATE 50 UG/ML
INJECTION, SOLUTION INTRAMUSCULAR; INTRAVENOUS
Status: DISPENSED
Start: 2023-07-11

## (undated) RX ORDER — OXYCODONE HYDROCHLORIDE 5 MG/1
TABLET ORAL
Status: DISPENSED
Start: 2025-04-08

## (undated) RX ORDER — OXYTOCIN/0.9 % SODIUM CHLORIDE 30/500 ML
PLASTIC BAG, INJECTION (ML) INTRAVENOUS
Status: DISPENSED
Start: 2023-07-11

## (undated) RX ORDER — KETOROLAC TROMETHAMINE 30 MG/ML
INJECTION, SOLUTION INTRAMUSCULAR; INTRAVENOUS
Status: DISPENSED
Start: 2025-04-08